# Patient Record
Sex: FEMALE | Race: WHITE | NOT HISPANIC OR LATINO | Employment: FULL TIME | URBAN - METROPOLITAN AREA
[De-identification: names, ages, dates, MRNs, and addresses within clinical notes are randomized per-mention and may not be internally consistent; named-entity substitution may affect disease eponyms.]

---

## 2017-01-20 ENCOUNTER — NON-PROVIDER VISIT (OUTPATIENT)
Dept: OCCUPATIONAL MEDICINE | Facility: CLINIC | Age: 52
End: 2017-01-20

## 2017-01-20 DIAGNOSIS — Z02.1 PRE-EMPLOYMENT DRUG SCREENING: ICD-10-CM

## 2017-01-20 LAB
AMP AMPHETAMINE: NORMAL
COC COCAINE: NORMAL
INT CON NEG: NORMAL
INT CON POS: NORMAL
MET METHAMPHETAMINES: NORMAL
OPI OPIATES: NORMAL
PCP PHENCYCLIDINE: NORMAL
POC DRUG COMMENT 753798-OCCUPATIONAL HEALTH: NORMAL
THC: NORMAL

## 2017-01-20 PROCEDURE — 80305 DRUG TEST PRSMV DIR OPT OBS: CPT | Performed by: PREVENTIVE MEDICINE

## 2017-03-16 ENCOUNTER — PATIENT MESSAGE (OUTPATIENT)
Dept: MEDICAL GROUP | Facility: CLINIC | Age: 52
End: 2017-03-16

## 2017-03-17 ENCOUNTER — PATIENT MESSAGE (OUTPATIENT)
Dept: MEDICAL GROUP | Facility: CLINIC | Age: 52
End: 2017-03-17

## 2017-03-17 ENCOUNTER — APPOINTMENT (OUTPATIENT)
Dept: MEDICAL GROUP | Facility: CLINIC | Age: 52
End: 2017-03-17
Payer: COMMERCIAL

## 2017-03-17 NOTE — TELEPHONE ENCOUNTER
From: Monica Manzo  To: Naina Raya M.D.  Sent: 3/17/2017 8:15 AM PDT  Subject: RE:stomach problems    Did you want me to come in and see you today? Still not better  ----- Message -----  From: Naina Raya M.D.  Sent: 3/16/2017 10:26 AM PDT  To: Monica Manzo  Subject: stomach problems    Are you having fever or chills? Do you have diarrhea? Any nausea, any visible blood in the stool?

## 2017-03-17 NOTE — TELEPHONE ENCOUNTER
1. Caller Name: Monica Manzo                      Call Back Number: [phone] (home) [phone] (work)    2. Message: pt called stating no chills no blood in stool. Fevers at night, nausea, diarrhea painfull when eating or drinking. Please advise or she will go to ED right away.     3. Patient approves office to leave a detailed voicemail/MyChart message: yes

## 2017-03-20 ENCOUNTER — OFFICE VISIT (OUTPATIENT)
Dept: MEDICAL GROUP | Facility: CLINIC | Age: 52
End: 2017-03-20
Payer: COMMERCIAL

## 2017-03-20 VITALS
SYSTOLIC BLOOD PRESSURE: 116 MMHG | RESPIRATION RATE: 16 BRPM | WEIGHT: 160 LBS | OXYGEN SATURATION: 99 % | DIASTOLIC BLOOD PRESSURE: 60 MMHG | BODY MASS INDEX: 25.11 KG/M2 | TEMPERATURE: 97.5 F | HEIGHT: 67 IN | HEART RATE: 62 BPM

## 2017-03-20 DIAGNOSIS — Z87.19 HISTORY OF GASTROINTESTINAL BLEEDING: ICD-10-CM

## 2017-03-20 DIAGNOSIS — Z87.19 HISTORY OF CHRONIC GASTRITIS: ICD-10-CM

## 2017-03-20 DIAGNOSIS — R10.13 EPIGASTRIC ABDOMINAL PAIN: ICD-10-CM

## 2017-03-20 PROCEDURE — 99214 OFFICE O/P EST MOD 30 MIN: CPT | Performed by: FAMILY MEDICINE

## 2017-03-20 RX ORDER — OMEPRAZOLE 20 MG/1
20 CAPSULE, DELAYED RELEASE ORAL DAILY
Qty: 30 CAP | Refills: 3 | Status: SHIPPED | OUTPATIENT
Start: 2017-03-20 | End: 2017-08-31

## 2017-03-20 RX ORDER — SUCRALFATE 1 G/1
1 TABLET ORAL
Qty: 120 TAB | Refills: 3 | Status: SHIPPED | OUTPATIENT
Start: 2017-03-20 | End: 2017-08-31

## 2017-03-20 ASSESSMENT — PATIENT HEALTH QUESTIONNAIRE - PHQ9: CLINICAL INTERPRETATION OF PHQ2 SCORE: 0

## 2017-03-20 NOTE — PROGRESS NOTES
CC: Abdominal pain, history of GI bleed and history of chronic gastritis    HPI:   Monica presents today with the following.    1. Epigastric abdominal pain  She complains of epigastric pain and general abdominal pain with bloating and some sharp pain for approximately a week. This came relatively out of the blue. She had no major change in diet. Denies taking anti-inflammatories. Her appetite remains fairly good but she has mild early satiety. Denies vomiting. She has mild nausea. Denies hematemesis. Denies visible blood or mucus in the stool. Denies white, black or maroon stools.  She has not been taking acid reducing medication of any kind now for several years because she had the Nissen fundoplication and she thought that would completely take care of her problems. Discussed with her that that probably took care of the esophageal reflux fairly well especially since she didn't have much symptoms but the gastric irritation that led to polyps and bleeding would probably still be there. She states she was negative for Helicobacter pylori in the past. There is no blood test for that in her record but I do believe Dr. Gaxiola did a biopsy and culture when he did her endoscopy.  Denies shortness of breath, weakness, faintness or chilling.    2. History of gastrointestinal bleeding  She has had bleeding in the past which was felt to be primarily due to esophagitis and gastritis. Denies any further signs or symptoms of bleeding.    3. History of chronic gastritis  She did have in the past severe chronic gastritis with gastric polyps.      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 30 Cap 3   • sucralfate (CARAFATE) 1 GM Tab Take 1 Tab by mouth 4 Times a Day,Before Meals and at Bedtime. 120 Tab 3   • Cholecalciferol (VITAMIN D PO) Take 1 Tab by mouth every day.     • VITAMIN E PO Take 1 Tab by mouth every day.     • Ascorbic Acid (VITAMIN C PO) Take 1 Tab by mouth every day.       No current facility-administered medications for this visit.     The omeprazole and carafate are added today    Allergies as of 03/20/2017 - [facility] as Reviewed 03/20/2017   Allergen Reaction Noted   • Morphine Vomiting 04/16/2013        ROS: As per HPI.    /60 mmHg  Pulse 62  Temp(Src) 36.4 °C (97.5 °F)  Resp 16  Ht 1.689 m (5' 6.5\")  Wt 72.576 kg (160 lb)  BMI 25.44 kg/m2  SpO2 99%    Physical Exam:  Gen:         Alert and oriented, No apparent distress.  Lucid and fluent.  HEENT:     EOMI, PERRLA.   Oropharynx is well hydrated with normal soft palate motion. No exudate or ulcerations noted.   Neck:         Full range of motion, moderate posterior cervical spasm. No JVD or carotid bruits appreciated. No cervical adenopathy appreciated. No thyromegaly or neck masses appreciated.  No retractions appreciated.  Lungs:       clear to auscultation A&P with good air movement.   Heart:        regular rate and rhythm normal S1 and S2 without murmur appreciated.  Strong and symmetric radial and DP pulses.  Abd:          soft, bowel sounds positive, bloated, epigastrium is quite tender with mild guarding. No rebound appreciated. No hepatosplenomegaly or mass appreciated. No pulsatile mass appreciated.  Ext:           Extremities show symmetric and full range of motion with normal strength. No cyanosis or clubbing appreciated. No peripheral edema appreciated.  Neuro:      Gait is normal. Patient is lucid, fluent and " appropriate. No significant tremor appreciated.  Skin:         shows no rashes, pigmented lesions or ulcerations.      Assessment and Plan.   51 y.o. female with the following issues.    1. Epigastric abdominal pain  Laboratory his discussed and placed. Upper GI ordered as it may take a couple months to get in to gastroenterology. Lab orders discussed and placed. Knee PPI and coating agent discussed.  - CBC WITHOUT DIFFERENTIAL; Future  - BASIC METABOLIC PANEL; Future  - HEPATIC FUNCTION PANEL; Future  - DX-UPPER GI-AIR CONTRAST; Future  - omeprazole (PRILOSEC) 20 MG delayed-release capsule; Take 1 Cap by mouth every day.  Dispense: 30 Cap; Refill: 3  - sucralfate (CARAFATE) 1 GM Tab; Take 1 Tab by mouth 4 Times a Day,Before Meals and at Bedtime.  Dispense: 120 Tab; Refill: 3  - REFERRAL TO GASTROENTEROLOGY    2. History of gastrointestinal bleeding  Orders discussed and placed  - omeprazole (PRILOSEC) 20 MG delayed-release capsule; Take 1 Cap by mouth every day.  Dispense: 30 Cap; Refill: 3  - sucralfate (CARAFATE) 1 GM Tab; Take 1 Tab by mouth 4 Times a Day,Before Meals and at Bedtime.  Dispense: 120 Tab; Refill: 3  - REFERRAL TO GASTROENTEROLOGY    3. History of chronic gastritis  The medication regimen is explained and discussed at length. Referral also discussed and placed.  - omeprazole (PRILOSEC) 20 MG delayed-release capsule; Take 1 Cap by mouth every day.  Dispense: 30 Cap; Refill: 3  - sucralfate (CARAFATE) 1 GM Tab; Take 1 Tab by mouth 4 Times a Day,Before Meals and at Bedtime.  Dispense: 120 Tab; Refill: 3  - REFERRAL TO GASTROENTEROLOGY

## 2017-03-21 ENCOUNTER — PATIENT MESSAGE (OUTPATIENT)
Dept: MEDICAL GROUP | Facility: CLINIC | Age: 52
End: 2017-03-21

## 2017-04-03 ENCOUNTER — HOSPITAL ENCOUNTER (OUTPATIENT)
Dept: LAB | Facility: MEDICAL CENTER | Age: 52
End: 2017-04-03
Attending: FAMILY MEDICINE
Payer: COMMERCIAL

## 2017-04-03 DIAGNOSIS — R10.13 EPIGASTRIC ABDOMINAL PAIN: ICD-10-CM

## 2017-04-03 LAB
ALBUMIN SERPL BCP-MCNC: 4.5 G/DL (ref 3.2–4.9)
ALP SERPL-CCNC: 68 U/L (ref 30–99)
ALT SERPL-CCNC: 10 U/L (ref 2–50)
ANION GAP SERPL CALC-SCNC: 8 MMOL/L (ref 0–11.9)
AST SERPL-CCNC: 16 U/L (ref 12–45)
BILIRUB CONJ SERPL-MCNC: <0.1 MG/DL (ref 0.1–0.5)
BILIRUB INDIRECT SERPL-MCNC: NORMAL MG/DL (ref 0–1)
BILIRUB SERPL-MCNC: 0.4 MG/DL (ref 0.1–1.5)
BUN SERPL-MCNC: 12 MG/DL (ref 8–22)
CALCIUM SERPL-MCNC: 9.5 MG/DL (ref 8.5–10.5)
CHLORIDE SERPL-SCNC: 105 MMOL/L (ref 96–112)
CO2 SERPL-SCNC: 27 MMOL/L (ref 20–33)
CREAT SERPL-MCNC: 0.91 MG/DL (ref 0.5–1.4)
ERYTHROCYTE [DISTWIDTH] IN BLOOD BY AUTOMATED COUNT: 43.1 FL (ref 35.9–50)
GFR SERPL CREATININE-BSD FRML MDRD: >60 ML/MIN/1.73 M 2
GLUCOSE SERPL-MCNC: 90 MG/DL (ref 65–99)
HCT VFR BLD AUTO: 43.3 % (ref 37–47)
HGB BLD-MCNC: 14.6 G/DL (ref 12–16)
MCH RBC QN AUTO: 31.3 PG (ref 27–33)
MCHC RBC AUTO-ENTMCNC: 33.7 G/DL (ref 33.6–35)
MCV RBC AUTO: 92.9 FL (ref 81.4–97.8)
PLATELET # BLD AUTO: 218 K/UL (ref 164–446)
PMV BLD AUTO: 11.3 FL (ref 9–12.9)
POTASSIUM SERPL-SCNC: 4.4 MMOL/L (ref 3.6–5.5)
PROT SERPL-MCNC: 7.7 G/DL (ref 6–8.2)
RBC # BLD AUTO: 4.66 M/UL (ref 4.2–5.4)
SODIUM SERPL-SCNC: 140 MMOL/L (ref 135–145)
WBC # BLD AUTO: 6.2 K/UL (ref 4.8–10.8)

## 2017-04-03 PROCEDURE — 85027 COMPLETE CBC AUTOMATED: CPT

## 2017-04-03 PROCEDURE — 80048 BASIC METABOLIC PNL TOTAL CA: CPT

## 2017-04-03 PROCEDURE — 80076 HEPATIC FUNCTION PANEL: CPT

## 2017-04-03 PROCEDURE — 36415 COLL VENOUS BLD VENIPUNCTURE: CPT

## 2017-04-06 ENCOUNTER — HOSPITAL ENCOUNTER (OUTPATIENT)
Dept: RADIOLOGY | Facility: MEDICAL CENTER | Age: 52
End: 2017-04-06
Attending: FAMILY MEDICINE
Payer: COMMERCIAL

## 2017-04-06 DIAGNOSIS — R10.13 EPIGASTRIC ABDOMINAL PAIN: ICD-10-CM

## 2017-04-06 PROCEDURE — 700101 HCHG RX REV CODE 250: Performed by: FAMILY MEDICINE

## 2017-04-06 PROCEDURE — 74247 DX-UPPER GI-AIR CONTRAST: CPT

## 2017-04-06 PROCEDURE — A9270 NON-COVERED ITEM OR SERVICE: HCPCS | Performed by: FAMILY MEDICINE

## 2017-04-06 PROCEDURE — 700112 HCHG RX REV CODE 229: Performed by: FAMILY MEDICINE

## 2017-04-06 RX ADMIN — ANTACID/ANTIFLATULENT 1 PACKET: 380; 550; 10; 10 GRANULE, EFFERVESCENT ORAL at 08:30

## 2017-04-06 RX ADMIN — BARIUM SULFATE 700 MG: 700 TABLET ORAL at 08:30

## 2017-04-07 ENCOUNTER — PATIENT MESSAGE (OUTPATIENT)
Dept: MEDICAL GROUP | Facility: CLINIC | Age: 52
End: 2017-04-07

## 2017-07-05 ENCOUNTER — HOSPITAL ENCOUNTER (OUTPATIENT)
Dept: RADIOLOGY | Facility: MEDICAL CENTER | Age: 52
End: 2017-07-05
Attending: FAMILY MEDICINE
Payer: COMMERCIAL

## 2017-07-05 DIAGNOSIS — Z12.31 ENCOUNTER FOR MAMMOGRAM TO ESTABLISH BASELINE MAMMOGRAM: ICD-10-CM

## 2017-07-05 PROCEDURE — 77063 BREAST TOMOSYNTHESIS BI: CPT

## 2017-08-18 ENCOUNTER — APPOINTMENT (OUTPATIENT)
Dept: MEDICAL GROUP | Facility: CLINIC | Age: 52
End: 2017-08-18
Payer: COMMERCIAL

## 2017-08-30 ENCOUNTER — TELEPHONE (OUTPATIENT)
Dept: MEDICAL GROUP | Facility: CLINIC | Age: 52
End: 2017-08-30

## 2017-08-30 NOTE — TELEPHONE ENCOUNTER
Future Appointments       Provider Department Center    8/31/2017 3:00 PM Naina Raya M.D. [facility]          ESTABLISHED PATIENT PRE-VISIT PLANNING     Note: Patient will not be contacted if there is no indication to call. PT was not Contacted.    1.    Reviewed note from last office visit with PCP: YES Last office visit: 3/20/17    2.  If any orders were placed at last visit, do we have Results/Consult Notes?        •  Labs - Labs ordered, completed and results are in chart.        •  Imaging - Imaging ordered, completed and results are in chart. UPPER GI       •  Referrals - Referral ordered, patient was seen and consult notes are in chart. Care Teams updated  YES. GASTROENTEROLOGY      3.  Immunizations were updated in Epic using WebIZ?: Epic matches WebIZ       •  Web Iz Recommendations:   MMR   Td (adult), adsorbed   CPOX (Varicella)   Influenza w/preserv.         4.  Patient is due for the following Health Maintenance Topics:   Health Maintenance Due   Topic Date Due   • COLONOSCOPY  05/25/2017   • IMM INFLUENZA (1) 09/01/2017           5.  Patient was not informed to arrive 15 min prior to their scheduled appointment and bring in their medication bottles.

## 2017-08-31 ENCOUNTER — OFFICE VISIT (OUTPATIENT)
Dept: MEDICAL GROUP | Facility: CLINIC | Age: 52
End: 2017-08-31
Payer: COMMERCIAL

## 2017-08-31 VITALS
WEIGHT: 152 LBS | DIASTOLIC BLOOD PRESSURE: 60 MMHG | TEMPERATURE: 98.2 F | HEART RATE: 82 BPM | OXYGEN SATURATION: 98 % | SYSTOLIC BLOOD PRESSURE: 110 MMHG | BODY MASS INDEX: 23.86 KG/M2 | RESPIRATION RATE: 16 BRPM | HEIGHT: 67 IN

## 2017-08-31 DIAGNOSIS — Z00.00 ROUTINE GENERAL MEDICAL EXAMINATION AT A HEALTH CARE FACILITY: ICD-10-CM

## 2017-08-31 DIAGNOSIS — Z00.00 LABORATORY EXAMINATION ORDERED AS PART OF A ROUTINE GENERAL MEDICAL EXAMINATION: ICD-10-CM

## 2017-08-31 PROCEDURE — 99396 PREV VISIT EST AGE 40-64: CPT | Performed by: FAMILY MEDICINE

## 2017-08-31 ASSESSMENT — ENCOUNTER SYMPTOMS
VOMITING: 0
SENSORY CHANGE: 0
FOCAL WEAKNESS: 0
DIARRHEA: 0
NERVOUS/ANXIOUS: 0
PALPITATIONS: 0
INSOMNIA: 0
WHEEZING: 0
DEPRESSION: 0
MYALGIAS: 0
NECK PAIN: 0
TINGLING: 0
SPEECH CHANGE: 0
BACK PAIN: 0
COUGH: 0
CHILLS: 0
HEADACHES: 0
SORE THROAT: 0
DOUBLE VISION: 0
TREMORS: 0
HALLUCINATIONS: 0
ORTHOPNEA: 0
SEIZURES: 0
NAUSEA: 1
SHORTNESS OF BREATH: 0
LOSS OF CONSCIOUSNESS: 0
WEIGHT LOSS: 0
FEVER: 0
SPUTUM PRODUCTION: 0
DIZZINESS: 0
ABDOMINAL PAIN: 1
CONSTIPATION: 1
MEMORY LOSS: 0
BLURRED VISION: 0
HEARTBURN: 1
BLOOD IN STOOL: 0

## 2017-08-31 ASSESSMENT — LIFESTYLE VARIABLES: SUBSTANCE_ABUSE: 0

## 2017-08-31 NOTE — PROGRESS NOTES
"Subjective:      Monica Manzo is a 51 y.o. female who presents with Annual Exam       She is here for his general medical examination.     HPI    Review of Systems   Constitutional: Positive for malaise/fatigue. Negative for chills, fever and weight loss.   HENT: Negative for congestion, hearing loss and sore throat.    Eyes: Negative for blurred vision and double vision.   Respiratory: Negative for cough, sputum production, shortness of breath and wheezing.    Cardiovascular: Negative for chest pain, palpitations, orthopnea and leg swelling.   Gastrointestinal: Positive for abdominal pain, constipation, heartburn and nausea. Negative for blood in stool, diarrhea and vomiting.   Genitourinary: Negative for dysuria, frequency and urgency.   Musculoskeletal: Negative for back pain, joint pain, myalgias and neck pain.   Skin: Negative for rash.   Neurological: Negative for dizziness, tingling, tremors, sensory change, speech change, focal weakness, seizures, loss of consciousness and headaches.   Psychiatric/Behavioral: Negative for depression, hallucinations, memory loss, substance abuse and suicidal ideas. The patient is not nervous/anxious and does not have insomnia.           Objective:     /60   Pulse 82   Temp 36.8 °C (98.2 °F)   Resp 16   Ht 1.689 m (5' 6.5\")   Wt 68.9 kg (152 lb)   SpO2 98%   BMI 24.17 kg/m²      Physical Exam   Constitutional: She is oriented to person, place, and time. She appears well-developed and well-nourished.   HENT:   Head: Normocephalic and atraumatic.   Mouth/Throat: Oropharynx is clear and moist.   Eyes: EOM are normal. Pupils are equal, round, and reactive to light. Left eye exhibits no discharge.   Neck: Normal range of motion. Neck supple. No JVD present. No thyromegaly present.   Cardiovascular: Normal rate, regular rhythm and normal heart sounds.    No murmur heard.  Pulmonary/Chest: Effort normal and breath sounds normal. No respiratory distress. She has no " wheezes. She has no rales.   Abdominal: Soft. Bowel sounds are normal. She exhibits no distension and no mass. There is no tenderness.   Musculoskeletal: Normal range of motion. She exhibits no edema.   Lymphadenopathy:     She has no cervical adenopathy.   Neurological: She is alert and oriented to person, place, and time. Coordination normal.   Skin: Skin is warm and dry. No rash noted. No erythema.   Psychiatric: She has a normal mood and affect. Her behavior is normal.   Vitals reviewed.              Assessment/Plan:     1. Routine general medical examination at a health care facility  She is generally well.  Medical problems are stable.    2. Laboratory examination ordered as part of a routine general medical examination  Routine orders discussed and placed  - COMP METABOLIC PANEL; Future  - LIPID PROFILE; Future  - CBC WITHOUT DIFFERENTIAL; Future

## 2017-09-05 ENCOUNTER — HOSPITAL ENCOUNTER (OUTPATIENT)
Dept: LAB | Facility: MEDICAL CENTER | Age: 52
End: 2017-09-05
Attending: OBSTETRICS & GYNECOLOGY
Payer: COMMERCIAL

## 2017-09-05 PROCEDURE — 88175 CYTOPATH C/V AUTO FLUID REDO: CPT

## 2017-09-05 PROCEDURE — 87624 HPV HI-RISK TYP POOLED RSLT: CPT

## 2017-09-07 LAB
CYTOLOGY REG CYTOL: NORMAL
HPV HR 12 DNA CVX QL NAA+PROBE: NEGATIVE
HPV16 DNA SPEC QL NAA+PROBE: NEGATIVE
HPV18 DNA SPEC QL NAA+PROBE: NEGATIVE
SPECIMEN SOURCE: NORMAL

## 2017-09-13 ENCOUNTER — HOSPITAL ENCOUNTER (OUTPATIENT)
Dept: LAB | Facility: MEDICAL CENTER | Age: 52
End: 2017-09-13
Payer: COMMERCIAL

## 2017-09-13 ENCOUNTER — HOSPITAL ENCOUNTER (OUTPATIENT)
Dept: LAB | Facility: MEDICAL CENTER | Age: 52
End: 2017-09-13
Attending: FAMILY MEDICINE
Payer: COMMERCIAL

## 2017-09-13 DIAGNOSIS — Z00.00 LABORATORY EXAMINATION ORDERED AS PART OF A ROUTINE GENERAL MEDICAL EXAMINATION: ICD-10-CM

## 2017-09-13 LAB
ALBUMIN SERPL BCP-MCNC: 4.2 G/DL (ref 3.2–4.9)
ALBUMIN/GLOB SERPL: 1.3 G/DL
ALP SERPL-CCNC: 72 U/L (ref 30–99)
ALT SERPL-CCNC: 10 U/L (ref 2–50)
ANION GAP SERPL CALC-SCNC: 7 MMOL/L (ref 0–11.9)
AST SERPL-CCNC: 14 U/L (ref 12–45)
BDY FAT % MEASURED: 29.9 %
BILIRUB SERPL-MCNC: 0.7 MG/DL (ref 0.1–1.5)
BP DIAS: 81 MMHG
BP SYS: 118 MMHG
BUN SERPL-MCNC: 16 MG/DL (ref 8–22)
CALCIUM SERPL-MCNC: 9.6 MG/DL (ref 8.5–10.5)
CHLORIDE SERPL-SCNC: 103 MMOL/L (ref 96–112)
CHOLEST SERPL-MCNC: 167 MG/DL (ref 100–199)
CHOLEST SERPL-MCNC: 171 MG/DL (ref 100–199)
CO2 SERPL-SCNC: 27 MMOL/L (ref 20–33)
CREAT SERPL-MCNC: 1.02 MG/DL (ref 0.5–1.4)
DIABETES HTDIA: NO
ERYTHROCYTE [DISTWIDTH] IN BLOOD BY AUTOMATED COUNT: 42.4 FL (ref 35.9–50)
EVENT NAME HTEVT: NORMAL
FASTING HTFAS: YES
GFR SERPL CREATININE-BSD FRML MDRD: 57 ML/MIN/1.73 M 2
GLOBULIN SER CALC-MCNC: 3.2 G/DL (ref 1.9–3.5)
GLUCOSE SERPL-MCNC: 79 MG/DL (ref 65–99)
GLUCOSE SERPL-MCNC: 80 MG/DL (ref 65–99)
HCT VFR BLD AUTO: 44.9 % (ref 37–47)
HDLC SERPL-MCNC: 61 MG/DL
HDLC SERPL-MCNC: 63 MG/DL
HGB BLD-MCNC: 15 G/DL (ref 12–16)
HYPERTENSION HTHYP: NO
LDLC SERPL CALC-MCNC: 89 MG/DL
LDLC SERPL CALC-MCNC: 91 MG/DL
MCH RBC QN AUTO: 30.7 PG (ref 27–33)
MCHC RBC AUTO-ENTMCNC: 33.4 G/DL (ref 33.6–35)
MCV RBC AUTO: 92 FL (ref 81.4–97.8)
PLATELET # BLD AUTO: 214 K/UL (ref 164–446)
PMV BLD AUTO: 10.6 FL (ref 9–12.9)
POTASSIUM SERPL-SCNC: 4.4 MMOL/L (ref 3.6–5.5)
PROT SERPL-MCNC: 7.4 G/DL (ref 6–8.2)
RBC # BLD AUTO: 4.88 M/UL (ref 4.2–5.4)
SCREENING LOC CITY HTCIT: NORMAL
SCREENING LOC STATE HTSTA: NORMAL
SCREENING LOCATION HTLOC: NORMAL
SMOKING HTSMO: NO
SODIUM SERPL-SCNC: 137 MMOL/L (ref 135–145)
SUBSCRIBER ID HTSID: NORMAL
TRIGL SERPL-MCNC: 87 MG/DL (ref 0–149)
TRIGL SERPL-MCNC: 87 MG/DL (ref 0–149)
WBC # BLD AUTO: 5.5 K/UL (ref 4.8–10.8)

## 2017-09-13 PROCEDURE — 85027 COMPLETE CBC AUTOMATED: CPT

## 2017-09-13 PROCEDURE — 80061 LIPID PANEL: CPT

## 2017-09-13 PROCEDURE — 82947 ASSAY GLUCOSE BLOOD QUANT: CPT

## 2017-09-13 PROCEDURE — 36415 COLL VENOUS BLD VENIPUNCTURE: CPT

## 2017-09-13 PROCEDURE — 80061 LIPID PANEL: CPT | Mod: 91

## 2017-09-13 PROCEDURE — S5190 WELLNESS ASSESSMENT BY NONPH: HCPCS

## 2017-09-13 PROCEDURE — 80053 COMPREHEN METABOLIC PANEL: CPT

## 2017-11-08 ENCOUNTER — IMMUNIZATION (OUTPATIENT)
Dept: OCCUPATIONAL MEDICINE | Facility: CLINIC | Age: 52
End: 2017-11-08

## 2017-11-08 DIAGNOSIS — Z23 NEED FOR VACCINATION: ICD-10-CM

## 2017-11-08 PROCEDURE — 90686 IIV4 VACC NO PRSV 0.5 ML IM: CPT | Performed by: PREVENTIVE MEDICINE

## 2017-11-28 ENCOUNTER — HOSPITAL ENCOUNTER (OUTPATIENT)
Dept: RADIOLOGY | Facility: MEDICAL CENTER | Age: 52
End: 2017-11-28
Attending: INTERNAL MEDICINE
Payer: COMMERCIAL

## 2017-11-28 ENCOUNTER — HOSPITAL ENCOUNTER (OUTPATIENT)
Dept: LAB | Facility: MEDICAL CENTER | Age: 52
End: 2017-11-28
Attending: INTERNAL MEDICINE
Payer: COMMERCIAL

## 2017-11-28 DIAGNOSIS — K62.5 HEMORRHAGE OF RECTUM AND ANUS: ICD-10-CM

## 2017-11-28 DIAGNOSIS — R13.19 CONSTANT LOW-GRADE DYSPHAGIA: ICD-10-CM

## 2017-11-28 DIAGNOSIS — R10.13 ABDOMINAL PAIN, EPIGASTRIC: ICD-10-CM

## 2017-11-28 DIAGNOSIS — Z12.11 SPECIAL SCREENING FOR MALIGNANT NEOPLASMS, COLON: ICD-10-CM

## 2017-11-28 LAB
ALBUMIN SERPL BCP-MCNC: 4.1 G/DL (ref 3.2–4.9)
ALP SERPL-CCNC: 64 U/L (ref 30–99)
ALT SERPL-CCNC: 11 U/L (ref 2–50)
AST SERPL-CCNC: 16 U/L (ref 12–45)
BILIRUB CONJ SERPL-MCNC: 0.1 MG/DL (ref 0.1–0.5)
BILIRUB INDIRECT SERPL-MCNC: 0.5 MG/DL (ref 0–1)
BILIRUB SERPL-MCNC: 0.6 MG/DL (ref 0.1–1.5)
LIPASE SERPL-CCNC: 21 U/L (ref 11–82)
PROT SERPL-MCNC: 6.9 G/DL (ref 6–8.2)

## 2017-11-28 PROCEDURE — 80076 HEPATIC FUNCTION PANEL: CPT

## 2017-11-28 PROCEDURE — 36415 COLL VENOUS BLD VENIPUNCTURE: CPT

## 2017-11-28 PROCEDURE — 76700 US EXAM ABDOM COMPLETE: CPT

## 2017-11-28 PROCEDURE — 83690 ASSAY OF LIPASE: CPT

## 2018-07-31 ENCOUNTER — OFFICE VISIT (OUTPATIENT)
Dept: MEDICAL GROUP | Facility: CLINIC | Age: 53
End: 2018-07-31
Payer: COMMERCIAL

## 2018-07-31 VITALS
TEMPERATURE: 98.2 F | HEART RATE: 63 BPM | RESPIRATION RATE: 14 BRPM | OXYGEN SATURATION: 99 % | SYSTOLIC BLOOD PRESSURE: 102 MMHG | BODY MASS INDEX: 23.46 KG/M2 | DIASTOLIC BLOOD PRESSURE: 64 MMHG | HEIGHT: 66 IN | WEIGHT: 146 LBS

## 2018-07-31 DIAGNOSIS — Z98.890 HISTORY OF OPEN REDUCTION AND INTERNAL FIXATION (ORIF) PROCEDURE: ICD-10-CM

## 2018-07-31 DIAGNOSIS — M25.571 CHRONIC PAIN OF RIGHT ANKLE: ICD-10-CM

## 2018-07-31 DIAGNOSIS — Z12.39 SCREENING BREAST EXAMINATION: ICD-10-CM

## 2018-07-31 DIAGNOSIS — Z00.00 LABORATORY EXAMINATION ORDERED AS PART OF A ROUTINE GENERAL MEDICAL EXAMINATION: ICD-10-CM

## 2018-07-31 DIAGNOSIS — G89.29 CHRONIC PAIN OF RIGHT ANKLE: ICD-10-CM

## 2018-07-31 DIAGNOSIS — Z00.00 ROUTINE GENERAL MEDICAL EXAMINATION AT A HEALTH CARE FACILITY: ICD-10-CM

## 2018-07-31 PROCEDURE — 99396 PREV VISIT EST AGE 40-64: CPT | Performed by: FAMILY MEDICINE

## 2018-07-31 ASSESSMENT — ENCOUNTER SYMPTOMS
EYE PAIN: 0
INSOMNIA: 0
MYALGIAS: 0
SENSORY CHANGE: 0
HALLUCINATIONS: 0
MEMORY LOSS: 0
COUGH: 0
FOCAL WEAKNESS: 0
CHILLS: 0
NERVOUS/ANXIOUS: 0
VOMITING: 0
PALPITATIONS: 0
HEARTBURN: 0
SEIZURES: 0
LOSS OF CONSCIOUSNESS: 0
DIZZINESS: 0
HEADACHES: 0
DOUBLE VISION: 0
BRUISES/BLEEDS EASILY: 0
FEVER: 0
SPUTUM PRODUCTION: 0
TREMORS: 0
BLURRED VISION: 0
DEPRESSION: 0
ABDOMINAL PAIN: 0
ORTHOPNEA: 0
SORE THROAT: 0
NAUSEA: 1
NECK PAIN: 0
WEIGHT LOSS: 0
BLOOD IN STOOL: 0
BACK PAIN: 0
DIARRHEA: 0
WEAKNESS: 0
SPEECH CHANGE: 0
TINGLING: 0
DIAPHORESIS: 0
WHEEZING: 0
SHORTNESS OF BREATH: 0

## 2018-07-31 ASSESSMENT — PATIENT HEALTH QUESTIONNAIRE - PHQ9: CLINICAL INTERPRETATION OF PHQ2 SCORE: 0

## 2018-07-31 ASSESSMENT — LIFESTYLE VARIABLES: SUBSTANCE_ABUSE: 0

## 2018-07-31 NOTE — PROGRESS NOTES
Right ankle ORIF for malleolar fracture in 2008.  Is having chronic right ankle pain off and on, worse in the last year.  Is in the region of the surgery and hardware.  Denies redness, fever or chills.  Has noted slight swelling.  Referral discussed and placed.

## 2018-07-31 NOTE — PROGRESS NOTES
Subjective:      Monica Manzo is a 52 y.o. female who presents with Annual Exam        She is here for her general medical examination.    HPI     has a past medical history of Anesthesia; Ankle fracture (12/08); Arthritis; Duodenal ulcer, unspecified as acute or chronic, without hemorrhage, perforation, or obstruction (7/2009); Endometriosis; Gastric polyps; Gastritis (2007); GERD (gastroesophageal reflux disease) (6/5/2009); Hiatus hernia syndrome; History of anemia; History of hepatitis C (1996); History of hypothyroidism; Intestinal metaplasia of gastric mucosa; and Intestinal metaplasia of gastric mucosa.   has a past surgical history that includes hysterectomy, vaginal; liver biopsy (4/17/08); tonsillectomy; ankle orif (12/14/2008); ankle orif (12/08); pippa by laparoscopy (4/17/08); abdominal hysterectomy total; pr anesth,surg breast reconstructive (12/15/2010); nissen fundoplication laparoscopic (7/6/2011); pr reduction of large breast; pelviscopy (4/22/2013); pelviscopy (1/29/2014); and lysis adhesions gyn (1/29/2014).  family history includes Cancer (age of onset: 45) in her mother; Diabetes in her sister; Heart Disease in her father.   reports that she has never smoked. She has never used smokeless tobacco. She reports that she does not drink alcohol or use drugs.      Current Outpatient Prescriptions:   •  Cholecalciferol (VITAMIN D PO), Take 1 Tab by mouth every day., Disp: , Rfl:   •  VITAMIN E PO, Take 1 Tab by mouth every day., Disp: , Rfl:   •  Ascorbic Acid (VITAMIN C PO), Take 1 Tab by mouth every day., Disp: , Rfl:   is allergic to morphine.    Review of Systems   Constitutional: Negative for chills, diaphoresis, fever, malaise/fatigue and weight loss.   HENT: Negative for congestion, ear pain, hearing loss, sore throat and tinnitus.    Eyes: Negative for blurred vision, double vision and pain.   Respiratory: Negative for cough, sputum production, shortness of breath and wheezing.   "  Cardiovascular: Negative for chest pain, palpitations, orthopnea and leg swelling.   Gastrointestinal: Positive for nausea. Negative for abdominal pain, blood in stool, diarrhea, heartburn and vomiting.        Occasional nausea and abdominal pain for a long time.   Genitourinary: Negative for dysuria, frequency, hematuria and urgency.   Musculoskeletal: Negative for back pain, joint pain, myalgias and neck pain.   Skin: Negative for rash.   Neurological: Negative for dizziness, tingling, tremors, sensory change, speech change, focal weakness, seizures, loss of consciousness, weakness and headaches.   Endo/Heme/Allergies: Negative for environmental allergies. Does not bruise/bleed easily.   Psychiatric/Behavioral: Negative for depression, hallucinations, memory loss, substance abuse and suicidal ideas. The patient is not nervous/anxious and does not have insomnia.           Objective:     /64   Pulse 63   Temp 36.8 °C (98.2 °F)   Resp 14   Ht 1.676 m (5' 6\")   Wt 66.2 kg (146 lb)   SpO2 99%   BMI 23.57 kg/m²      Physical Exam   Constitutional: She is oriented to person, place, and time. She appears well-developed and well-nourished.   HENT:   Head: Normocephalic and atraumatic.   Mouth/Throat: Oropharynx is clear and moist.   Eyes: Pupils are equal, round, and reactive to light. EOM are normal. Left eye exhibits no discharge.   Neck: Normal range of motion. Neck supple. No JVD present. No thyromegaly present.   Cardiovascular: Normal rate, regular rhythm and normal heart sounds.    No murmur heard.  Pulmonary/Chest: Effort normal and breath sounds normal. No respiratory distress. She has no wheezes. She has no rales.   Abdominal: Soft. Bowel sounds are normal. She exhibits no distension and no mass. There is no tenderness.   Musculoskeletal: Normal range of motion. She exhibits tenderness. She exhibits no edema.        Feet:    Lymphadenopathy:     She has no cervical adenopathy.   Neurological: She " is alert and oriented to person, place, and time. Coordination normal.   Skin: Skin is warm and dry. No rash noted. No erythema.   Psychiatric: She has a normal mood and affect. Her behavior is normal.   Vitals reviewed.              Assessment/Plan:     1. Routine general medical examination at a health care facility  She is generally well.  Medical problems are stable.    2. Laboratory examination ordered as part of a routine general medical examination  Routine orders discussed and placed  - COMP METABOLIC PANEL; Future  - LIPID PROFILE; Future  - CBC WITHOUT DIFFERENTIAL; Future    3. Screening breast examination  Order discussed and placed.  - MA-MAMMO SCREENING BILAT W/MARY W/CAD; Future

## 2018-08-17 ENCOUNTER — HOSPITAL ENCOUNTER (OUTPATIENT)
Dept: RADIOLOGY | Facility: MEDICAL CENTER | Age: 53
End: 2018-08-17
Attending: FAMILY MEDICINE
Payer: COMMERCIAL

## 2018-08-17 DIAGNOSIS — Z12.39 SCREENING BREAST EXAMINATION: ICD-10-CM

## 2018-08-17 PROCEDURE — 77067 SCR MAMMO BI INCL CAD: CPT

## 2018-08-25 ENCOUNTER — HOSPITAL ENCOUNTER (OUTPATIENT)
Dept: LAB | Facility: MEDICAL CENTER | Age: 53
End: 2018-08-25
Payer: COMMERCIAL

## 2018-08-25 ENCOUNTER — HOSPITAL ENCOUNTER (OUTPATIENT)
Dept: LAB | Facility: MEDICAL CENTER | Age: 53
End: 2018-08-25
Attending: FAMILY MEDICINE
Payer: COMMERCIAL

## 2018-08-25 LAB
ALBUMIN SERPL BCP-MCNC: 4.2 G/DL (ref 3.2–4.9)
ALBUMIN/GLOB SERPL: 1.4 G/DL
ALP SERPL-CCNC: 71 U/L (ref 30–99)
ALT SERPL-CCNC: 14 U/L (ref 2–50)
ANION GAP SERPL CALC-SCNC: 8 MMOL/L (ref 0–11.9)
AST SERPL-CCNC: 19 U/L (ref 12–45)
BASOPHILS # BLD AUTO: 0.9 % (ref 0–1.8)
BASOPHILS # BLD: 0.05 K/UL (ref 0–0.12)
BDY FAT % MEASURED: 27.1 %
BILIRUB SERPL-MCNC: 0.6 MG/DL (ref 0.1–1.5)
BP DIAS: 89 MMHG
BP SYS: 125 MMHG
BUN SERPL-MCNC: 21 MG/DL (ref 8–22)
CALCIUM SERPL-MCNC: 9.4 MG/DL (ref 8.5–10.5)
CHLORIDE SERPL-SCNC: 107 MMOL/L (ref 96–112)
CHOLEST SERPL-MCNC: 195 MG/DL (ref 100–199)
CHOLEST SERPL-MCNC: 199 MG/DL (ref 100–199)
CO2 SERPL-SCNC: 27 MMOL/L (ref 20–33)
CREAT SERPL-MCNC: 0.95 MG/DL (ref 0.5–1.4)
DIABETES HTDIA: NO
EOSINOPHIL # BLD AUTO: 0.15 K/UL (ref 0–0.51)
EOSINOPHIL NFR BLD: 2.7 % (ref 0–6.9)
ERYTHROCYTE [DISTWIDTH] IN BLOOD BY AUTOMATED COUNT: 44.5 FL (ref 35.9–50)
EVENT NAME HTEVT: NORMAL
FASTING HTFAS: YES
GLOBULIN SER CALC-MCNC: 2.9 G/DL (ref 1.9–3.5)
GLUCOSE SERPL-MCNC: 94 MG/DL (ref 65–99)
GLUCOSE SERPL-MCNC: 96 MG/DL (ref 65–99)
HCT VFR BLD AUTO: 45.5 % (ref 37–47)
HDLC SERPL-MCNC: 76 MG/DL
HDLC SERPL-MCNC: 78 MG/DL
HGB BLD-MCNC: 14.7 G/DL (ref 12–16)
HYPERTENSION HTHYP: NO
IMM GRANULOCYTES # BLD AUTO: 0.01 K/UL (ref 0–0.11)
IMM GRANULOCYTES NFR BLD AUTO: 0.2 % (ref 0–0.9)
LDLC SERPL CALC-MCNC: 101 MG/DL
LDLC SERPL CALC-MCNC: 108 MG/DL
LYMPHOCYTES # BLD AUTO: 1.99 K/UL (ref 1–4.8)
LYMPHOCYTES NFR BLD: 36.4 % (ref 22–41)
MCH RBC QN AUTO: 30.5 PG (ref 27–33)
MCHC RBC AUTO-ENTMCNC: 32.3 G/DL (ref 33.6–35)
MCV RBC AUTO: 94.4 FL (ref 81.4–97.8)
MONOCYTES # BLD AUTO: 0.41 K/UL (ref 0–0.85)
MONOCYTES NFR BLD AUTO: 7.5 % (ref 0–13.4)
NEUTROPHILS # BLD AUTO: 2.85 K/UL (ref 2–7.15)
NEUTROPHILS NFR BLD: 52.3 % (ref 44–72)
NRBC # BLD AUTO: 0 K/UL
NRBC BLD-RTO: 0 /100 WBC
PLATELET # BLD AUTO: 205 K/UL (ref 164–446)
PMV BLD AUTO: 10.5 FL (ref 9–12.9)
POTASSIUM SERPL-SCNC: 4.6 MMOL/L (ref 3.6–5.5)
PROT SERPL-MCNC: 7.1 G/DL (ref 6–8.2)
RBC # BLD AUTO: 4.82 M/UL (ref 4.2–5.4)
SCREENING LOC CITY HTCIT: NORMAL
SCREENING LOC STATE HTSTA: NORMAL
SCREENING LOCATION HTLOC: NORMAL
SMOKING HTSMO: NO
SODIUM SERPL-SCNC: 142 MMOL/L (ref 135–145)
SUBSCRIBER ID HTSID: NORMAL
TRIGL SERPL-MCNC: 77 MG/DL (ref 0–149)
TRIGL SERPL-MCNC: 79 MG/DL (ref 0–149)
WBC # BLD AUTO: 5.5 K/UL (ref 4.8–10.8)

## 2018-08-25 PROCEDURE — 80061 LIPID PANEL: CPT | Mod: 91

## 2018-08-25 PROCEDURE — 80061 LIPID PANEL: CPT

## 2018-08-25 PROCEDURE — 85025 COMPLETE CBC W/AUTO DIFF WBC: CPT

## 2018-08-25 PROCEDURE — 80053 COMPREHEN METABOLIC PANEL: CPT

## 2018-08-25 PROCEDURE — 36415 COLL VENOUS BLD VENIPUNCTURE: CPT

## 2018-08-25 PROCEDURE — S5190 WELLNESS ASSESSMENT BY NONPH: HCPCS

## 2018-08-25 PROCEDURE — 82947 ASSAY GLUCOSE BLOOD QUANT: CPT

## 2018-08-27 ENCOUNTER — PATIENT MESSAGE (OUTPATIENT)
Dept: MEDICAL GROUP | Facility: CLINIC | Age: 53
End: 2018-08-27

## 2018-08-27 DIAGNOSIS — Z87.42 HISTORY OF OVARIAN CYST: ICD-10-CM

## 2018-08-27 DIAGNOSIS — R10.2 ACUTE PELVIC PAIN, FEMALE: ICD-10-CM

## 2018-08-30 ENCOUNTER — HOSPITAL ENCOUNTER (OUTPATIENT)
Dept: LAB | Facility: MEDICAL CENTER | Age: 53
End: 2018-08-30
Attending: OBSTETRICS & GYNECOLOGY
Payer: COMMERCIAL

## 2018-08-30 LAB
BASOPHILS # BLD AUTO: 0.6 % (ref 0–1.8)
BASOPHILS # BLD: 0.04 K/UL (ref 0–0.12)
EOSINOPHIL # BLD AUTO: 0.12 K/UL (ref 0–0.51)
EOSINOPHIL NFR BLD: 1.8 % (ref 0–6.9)
ERYTHROCYTE [DISTWIDTH] IN BLOOD BY AUTOMATED COUNT: 44.1 FL (ref 35.9–50)
HCT VFR BLD AUTO: 42.3 % (ref 37–47)
HGB BLD-MCNC: 14.1 G/DL (ref 12–16)
IMM GRANULOCYTES # BLD AUTO: 0.02 K/UL (ref 0–0.11)
IMM GRANULOCYTES NFR BLD AUTO: 0.3 % (ref 0–0.9)
LYMPHOCYTES # BLD AUTO: 2 K/UL (ref 1–4.8)
LYMPHOCYTES NFR BLD: 30.1 % (ref 22–41)
MCH RBC QN AUTO: 31.1 PG (ref 27–33)
MCHC RBC AUTO-ENTMCNC: 33.3 G/DL (ref 33.6–35)
MCV RBC AUTO: 93.2 FL (ref 81.4–97.8)
MONOCYTES # BLD AUTO: 0.51 K/UL (ref 0–0.85)
MONOCYTES NFR BLD AUTO: 7.7 % (ref 0–13.4)
NEUTROPHILS # BLD AUTO: 3.95 K/UL (ref 2–7.15)
NEUTROPHILS NFR BLD: 59.5 % (ref 44–72)
NRBC # BLD AUTO: 0 K/UL
NRBC BLD-RTO: 0 /100 WBC
PLATELET # BLD AUTO: 213 K/UL (ref 164–446)
PMV BLD AUTO: 10.4 FL (ref 9–12.9)
RBC # BLD AUTO: 4.54 M/UL (ref 4.2–5.4)
WBC # BLD AUTO: 6.6 K/UL (ref 4.8–10.8)

## 2018-08-30 PROCEDURE — 36415 COLL VENOUS BLD VENIPUNCTURE: CPT

## 2018-08-30 PROCEDURE — 85025 COMPLETE CBC W/AUTO DIFF WBC: CPT

## 2018-09-04 ENCOUNTER — PATIENT MESSAGE (OUTPATIENT)
Dept: MEDICAL GROUP | Facility: CLINIC | Age: 53
End: 2018-09-04

## 2018-09-04 DIAGNOSIS — R10.2 CHRONIC FEMALE PELVIC PAIN: ICD-10-CM

## 2018-09-04 DIAGNOSIS — G89.29 CHRONIC FEMALE PELVIC PAIN: ICD-10-CM

## 2018-09-05 NOTE — TELEPHONE ENCOUNTER
From: Monica Manzo  To: Geovanni Raya M.D.  Sent: 9/4/2018 2:27 PM PDT  Subject: Test Result Question    Yes I am, it has not gone away  ----- Message -----  From: Geovanni Raya M.D.  Sent: 9/4/2018 1:50 PM PDT  To: Monica Manzo  Subject: RE: Test Result Question  Your CBC result from the 30th is completely normal. There was no evidence of infection on that blood test. I assume you are still having pelvic pain? If so, we could consider getting a CT scan    Geovanni Raya M.D.      ----- Message -----   From: Monica Manzo   Sent: 9/4/2018 11:34 AM PDT   To: Geovanni Raya M.D.  Subject: Test Result Question    Good morning Dr Raya, I just got a call from Dr Bui's office and was told that my test results were normal. He wanted me to do a blood test for my pain. 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    Monica

## 2018-09-06 ENCOUNTER — PATIENT MESSAGE (OUTPATIENT)
Dept: MEDICAL GROUP | Facility: CLINIC | Age: 53
End: 2018-09-06

## 2018-09-06 NOTE — TELEPHONE ENCOUNTER
From: Monica Manzo  To: Geovanni Raya M.D.  Sent: 9/6/2018 9:47 AM PDT  Subject: Test Result Question    Diarrhea comes and goes, it is not consistent. No fever   ----- Message -----  From: Geovanni Raya M.D.  Sent: 9/5/2018 10:04 AM PDT  To: Monica Manzo  Subject: RE: Test Result Question  Any diarrhea or fever? I have placed the pelvic CT order    Geovanni Raya M.D.      ----- Message -----   From: Monica Manzo   Sent: 9/4/2018 2:27 PM PDT   To: Geovanni Raya M.D.  Subject: Test Result Question    Yes I am, it has not gone away  ----- Message -----  From: Geovanni Raya M.D.  Sent: 9/4/2018 1:50 PM PDT  To: Monica Manzo  Subject: RE: Test Result Question  Your CBC result from the 30th is completely normal. There was no evidence of infection on that blood test. I assume you are still having pelvic pain? If so, we could consider getting a CT scan    Geovanni Raya M.D.      ----- Message -----   From: Monica Manzo   Sent: 9/4/2018 11:34 AM PDT   To: Geovanni Raya M.D.  Subject: Test Result Question    Good morning Dr Raya, I just got a call from Dr Bui's office and was told that my test results were normal. He wanted me to do a blood test for my pain. 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    Monica

## 2018-09-10 ENCOUNTER — APPOINTMENT (OUTPATIENT)
Dept: ADMISSIONS | Facility: MEDICAL CENTER | Age: 53
End: 2018-09-10
Attending: ORTHOPAEDIC SURGERY
Payer: COMMERCIAL

## 2018-09-11 ENCOUNTER — APPOINTMENT (OUTPATIENT)
Dept: RADIOLOGY | Facility: MEDICAL CENTER | Age: 53
End: 2018-09-11
Attending: ORTHOPAEDIC SURGERY
Payer: COMMERCIAL

## 2018-09-11 ENCOUNTER — HOSPITAL ENCOUNTER (OUTPATIENT)
Facility: MEDICAL CENTER | Age: 53
End: 2018-09-11
Attending: ORTHOPAEDIC SURGERY | Admitting: ORTHOPAEDIC SURGERY
Payer: COMMERCIAL

## 2018-09-11 VITALS
HEART RATE: 62 BPM | TEMPERATURE: 98.2 F | WEIGHT: 147.05 LBS | OXYGEN SATURATION: 97 % | RESPIRATION RATE: 16 BRPM | HEIGHT: 67 IN | BODY MASS INDEX: 23.08 KG/M2

## 2018-09-11 PROCEDURE — 160035 HCHG PACU - 1ST 60 MINS PHASE I: Performed by: ORTHOPAEDIC SURGERY

## 2018-09-11 PROCEDURE — 160028 HCHG SURGERY MINUTES - 1ST 30 MINS LEVEL 3: Performed by: ORTHOPAEDIC SURGERY

## 2018-09-11 PROCEDURE — 700101 HCHG RX REV CODE 250

## 2018-09-11 PROCEDURE — 501838 HCHG SUTURE GENERAL: Performed by: ORTHOPAEDIC SURGERY

## 2018-09-11 PROCEDURE — A6454 SELF-ADHER BAND W>=3" <5"/YD: HCPCS | Performed by: ORTHOPAEDIC SURGERY

## 2018-09-11 PROCEDURE — 160025 RECOVERY II MINUTES (STATS): Performed by: ORTHOPAEDIC SURGERY

## 2018-09-11 PROCEDURE — 500881 HCHG PACK, EXTREMITY: Performed by: ORTHOPAEDIC SURGERY

## 2018-09-11 PROCEDURE — 700102 HCHG RX REV CODE 250 W/ 637 OVERRIDE(OP)

## 2018-09-11 PROCEDURE — 73610 X-RAY EXAM OF ANKLE: CPT | Mod: RT

## 2018-09-11 PROCEDURE — 160039 HCHG SURGERY MINUTES - EA ADDL 1 MIN LEVEL 3: Performed by: ORTHOPAEDIC SURGERY

## 2018-09-11 PROCEDURE — 160036 HCHG PACU - EA ADDL 30 MINS PHASE I: Performed by: ORTHOPAEDIC SURGERY

## 2018-09-11 PROCEDURE — 160048 HCHG OR STATISTICAL LEVEL 1-5: Performed by: ORTHOPAEDIC SURGERY

## 2018-09-11 PROCEDURE — 160046 HCHG PACU - 1ST 60 MINS PHASE II: Performed by: ORTHOPAEDIC SURGERY

## 2018-09-11 PROCEDURE — A9270 NON-COVERED ITEM OR SERVICE: HCPCS

## 2018-09-11 PROCEDURE — 700111 HCHG RX REV CODE 636 W/ 250 OVERRIDE (IP)

## 2018-09-11 PROCEDURE — 160002 HCHG RECOVERY MINUTES (STAT): Performed by: ORTHOPAEDIC SURGERY

## 2018-09-11 PROCEDURE — 160009 HCHG ANES TIME/MIN: Performed by: ORTHOPAEDIC SURGERY

## 2018-09-11 RX ORDER — BUPIVACAINE HYDROCHLORIDE 5 MG/ML
INJECTION, SOLUTION EPIDURAL; INTRACAUDAL
Status: DISCONTINUED | OUTPATIENT
Start: 2018-09-11 | End: 2018-09-11 | Stop reason: HOSPADM

## 2018-09-11 RX ORDER — ACETAMINOPHEN 500 MG
TABLET ORAL
Status: DISCONTINUED
Start: 2018-09-11 | End: 2018-09-11 | Stop reason: HOSPADM

## 2018-09-11 RX ORDER — OXYCODONE HCL 10 MG/1
TABLET, FILM COATED, EXTENDED RELEASE ORAL
Status: DISCONTINUED
Start: 2018-09-11 | End: 2018-09-11 | Stop reason: HOSPADM

## 2018-09-11 RX ORDER — SODIUM CHLORIDE, SODIUM LACTATE, POTASSIUM CHLORIDE, CALCIUM CHLORIDE 600; 310; 30; 20 MG/100ML; MG/100ML; MG/100ML; MG/100ML
INJECTION, SOLUTION INTRAVENOUS CONTINUOUS
Status: DISCONTINUED | OUTPATIENT
Start: 2018-09-11 | End: 2018-09-11 | Stop reason: HOSPADM

## 2018-09-11 RX ORDER — LIDOCAINE HYDROCHLORIDE 10 MG/ML
INJECTION, SOLUTION INFILTRATION; PERINEURAL
Status: COMPLETED
Start: 2018-09-11 | End: 2018-09-11

## 2018-09-11 RX ORDER — SCOLOPAMINE TRANSDERMAL SYSTEM 1 MG/1
PATCH, EXTENDED RELEASE TRANSDERMAL
Status: COMPLETED
Start: 2018-09-11 | End: 2018-09-11

## 2018-09-11 RX ORDER — LIDOCAINE HYDROCHLORIDE 10 MG/ML
0.5 INJECTION, SOLUTION INFILTRATION; PERINEURAL
Status: DISCONTINUED | OUTPATIENT
Start: 2018-09-11 | End: 2018-09-11 | Stop reason: HOSPADM

## 2018-09-11 RX ADMIN — LIDOCAINE HYDROCHLORIDE 0.5 ML: 10 INJECTION, SOLUTION INFILTRATION; PERINEURAL at 06:00

## 2018-09-11 RX ADMIN — SODIUM CHLORIDE, SODIUM LACTATE, POTASSIUM CHLORIDE, CALCIUM CHLORIDE: 600; 310; 30; 20 INJECTION, SOLUTION INTRAVENOUS at 06:15

## 2018-09-11 RX ADMIN — SCOPOLAMINE 1 PATCH: 1 PATCH, EXTENDED RELEASE TRANSDERMAL at 06:45

## 2018-09-11 ASSESSMENT — PAIN SCALES - GENERAL
PAINLEVEL_OUTOF10: 2
PAINLEVEL_OUTOF10: 0
PAINLEVEL_OUTOF10: 2
PAINLEVEL_OUTOF10: 2
PAINLEVEL_OUTOF10: 6
PAINLEVEL_OUTOF10: 2
PAINLEVEL_OUTOF10: 0

## 2018-09-11 NOTE — OR NURSING
Patient has done well in recovery. Right ankle is elevated with ice and she is having mild pain that is tolerable. Denies nausea and is now having crackers and ginger ale.  has been called and message left. He is golfing. She is ready for stage 2. Scripts are in her NIA chart.

## 2018-09-11 NOTE — OR SURGEON
Immediate Post OP Note    PreOp Diagnosis: Right ankle retained hardware    PostOp Diagnosis: Same    Procedure(s):  HARDWARE REMOVAL ORTHO- ANKLE (4.0 CANNULATED SCREW) - Wound Class: Clean    Surgeon(s):  García Hernández M.D.    Anesthesiologist/Type of Anesthesia:  Anesthesiologist: Chrissie Woods M.D./General    Surgical Staff:  Circulator: Gabby Arguelles R.N.  Scrub Person: Gilda Madrigal; Evaristo Gracia  Radiology Technologist: Geraldine Palafox    Specimens removed if any:  * No specimens in log *    Estimated Blood Loss: 5cc    Findings: Healed medial mall fracture    Complications: None        9/11/2018 7:25 AM García Hernández M.D.

## 2018-09-11 NOTE — OP REPORT
DATE OF SERVICE:  09/11/2018    PREOPERATIVE DIAGNOSES:  1.  Right ankle retained hardware.  2.  Right healed medial malleolus fracture, routine.    POSTOPERATIVE DIAGNOSES:  1.  Right ankle retained hardware.  2.  Right healed medial malleolus fracture, routine.    PROCEDURE:  Removal of hardware, medial ankle.    SURGEON:  García Hernández MD    ASSISTANT:  SAMSON Amaya    ANESTHESIA:  General with local 0.5% Marcaine without epinephrine.    ESTIMATED BLOOD LOSS:  5 mL.    TOURNIQUET TIME:  None.    FINDINGS:  Healed medial malleolar fracture.    COMPLICATIONS:  None.    OUTCOME:  To PACU in stable condition.    HISTORY OF PRESENT ILLNESS:  The patient is a very pleasant 52-year-old   female, 10 years out from ORIF of medial malleolus fracture.  She has gone to   heal and have prominent screw heads that are bothersome.  She was agreed in   the preoperative holding area and identified by name and medical record   number.  The right lower extremity was marked.  Risks of the procedure   including bleeding, infection, pain, need for more surgery, neurovascular   damage were discussed and she provided written consent.    DESCRIPTION OF PROCEDURE:  She was taken to the operating room and placed on   the table in supine position.  Preoperative antibiotics were administered and   general anesthesia was induced.  A nonsterile tourniquet was placed on her   right thigh that was not used, right lower extremity was prepped and draped in   the usual sterile fashion.  An operative pause was undertaken, where all   present were in agreement with patient identification, laterality, and   procedure to be performed.  The patient had a long curvilinear anteromedial   incision that was quite far from the screw heads.  We elected instead to make   a 1 cm longitudinal incision directly over the palpable screw heads.  Blunt   dissection was carried down to the posterior screw head and then the anterior   screw head was found  with x-rays.  It was completely overgrown with bone.    Each screw was removed without significant difficulty and the screw tracks   were curetted.  The wound was copiously irrigated and closed with 3-0 nylon in   horizontal mattress fashion.  AP, mortise and lateral fluoroscopy revealed   healing of her medial malleolus without evidence of interval complication.    She was awoken and extubated in stable condition.    POSTOPERATIVE COURSE:  She was discharged to home today assuming adequate pain   control and mobilization.  She may change her dressing on postoperative day   #3.  I will see her in 10-14 days for suture removal and wound check.       ____________________________________     MD PETERSON NICOLAS / RODRIGO    DD:  09/11/2018 07:31:31  DT:  09/11/2018 08:17:26    D#:  [id number]  Job#:  [id number]

## 2018-09-11 NOTE — OR NURSING
arrived to hospital for discharge home. Discharge orders in computer. Patient verbalizes readiness for discharge. IV discontinued. Instructions and medications reviewed with patient and family.  Follow up appointment discussed.  Patient and family verbalize understanding of discharge instructions and medications. Patient and family signed discharge instructions. Patient verbalized all belongings had been returned.  Discharged via wheelchair to home with family.

## 2018-09-11 NOTE — DISCHARGE INSTRUCTIONS
ACTIVITY: Rest and take it easy for the first 24 hours.  A responsible adult is recommended to remain with you during that time.  It is normal to feel sleepy.  We encourage you to not do anything that requires balance, judgment or coordination.    MILD FLU-LIKE SYMPTOMS ARE NORMAL. YOU MAY EXPERIENCE GENERALIZED MUSCLE ACHES, THROAT IRRITATION, HEADACHE AND/OR SOME NAUSEA.    FOR 24 HOURS DO NOT:  Drive, operate machinery or run household appliances.  Drink beer or alcoholic beverages.   Make important decisions or sign legal documents.    SPECIAL INSTRUCTIONS: Loosen ace wrap in 8 hours (3:30pm), keep right leg elevated above heart and ice to site. Please refer to your after care instruction provided in your [facility] folder.    Weight Bearing As Tolerated Right Lower Extremity   Ice and elevate   May change dressing Friday, Post Operative Day #3 (8/14/18)  Stay ahead of pain    DIET: To avoid nausea, slowly advance diet as tolerated, avoiding spicy or greasy foods for the first day.  Add more substantial food to your diet according to your physician's instructions.  Babies can be fed formula or breast milk as soon as they are hungry.  INCREASE FLUIDS AND FIBER TO AVOID CONSTIPATION.    SURGICAL DRESSING/BATHING: See Discharge instructions from [facility] in your folder    FOLLOW-UP APPOINTMENT:  A follow-up appointment should be arranged with your doctor is 9/24/18 @1100 am     You should CALL YOUR PHYSICIAN if you develop:  Fever greater than 101 degrees F.  Pain not relieved by medication, or persistent nausea or vomiting.  Excessive bleeding (blood soaking through dressing) or unexpected drainage from the wound.  Extreme redness or swelling around the incision site, drainage of pus or foul smelling drainage.  Inability to urinate or empty your bladder within 8 hours.  Problems with breathing or chest pain.    You should call 911 if you develop problems with breathing or chest  pain.  If you are unable to contact your doctor or surgical center, you should go to the nearest emergency room or urgent care center.  Physician's telephone #: Dr. Hernández [phone]    If any questions arise, call your doctor.  If your doctor is not available, please feel free to call the Surgical Center at [phone].  The Center is open Monday through Friday from 7AM to 7PM.  You can also call the HEALTH HOTLINE open 24 hours/day, 7 days/week and speak to a nurse at [phone], or toll free at [phone].    A registered nurse may call you a few days after your surgery to see how you are doing after your procedure.    MEDICATIONS: Resume taking daily medication.  Take prescribed pain medication with food.  If no medication is prescribed, you may take non-aspirin pain medication if needed.  PAIN MEDICATION CAN BE VERY CONSTIPATING.  Take a stool softener or laxative such as senokot, pericolace, or milk of magnesia if needed.    Prescription given for Norco.  Last pain medication given at 0645am .    If your physician has prescribed pain medication that includes Acetaminophen (Tylenol), do not take additional Acetaminophen (Tylenol) while taking the prescribed medication.    Depression / Suicide Risk    As you are discharged from this [facility] facility, it is important to learn how to keep safe from harming yourself.    Recognize the warning signs:  · Abrupt changes in personality, positive or negative- including increase in energy   · Giving away possessions  · Change in eating patterns- significant weight changes-  positive or negative  · Change in sleeping patterns- unable to sleep or sleeping all the time   · Unwillingness or inability to communicate  · Depression  · Unusual sadness, discouragement and loneliness  · Talk of wanting to die  · Neglect of personal appearance   · Rebelliousness- reckless behavior  · Withdrawal from people/activities they love  · Confusion- inability to  concentrate     If you or a loved one observes any of these behaviors or has concerns about self-harm, here's what you can do:  · Talk about it- your feelings and reasons for harming yourself  · Remove any means that you might use to hurt yourself (examples: pills, rope, extension cords, firearm)  · Get professional help from the community (Mental Health, Substance Abuse, psychological counseling)  · Do not be alone:Call your Safe Contact- someone whom you trust who will be there for you.  · Call your local CRISIS HOTLINE 159-8883 or 113-178-9641  · Call your local Children's Mobile Crisis Response Team [place] (142) 796-1448 or www.[employer]  · Call the toll free National Suicide Prevention Hotlines   · National Suicide Prevention Lifeline 760-124-QPYJ (1571)  · National Hope Line Network 800-SUICIDE (202-6083)

## 2018-09-25 ENCOUNTER — IMMUNIZATION (OUTPATIENT)
Dept: OCCUPATIONAL MEDICINE | Facility: CLINIC | Age: 53
End: 2018-09-25

## 2018-09-25 DIAGNOSIS — Z23 NEED FOR VACCINATION: ICD-10-CM

## 2018-09-25 PROCEDURE — 90686 IIV4 VACC NO PRSV 0.5 ML IM: CPT | Performed by: PREVENTIVE MEDICINE

## 2018-10-18 DIAGNOSIS — K44.9 ESOPHAGEAL HIATAL HERNIA: ICD-10-CM

## 2018-10-18 DIAGNOSIS — Z87.19 HISTORY OF GASTRITIS: ICD-10-CM

## 2018-10-18 DIAGNOSIS — K31.A0 INTESTINAL METAPLASIA OF GASTRIC MUCOSA: ICD-10-CM

## 2018-10-18 DIAGNOSIS — R10.13 EPIGASTRIC ABDOMINAL PAIN: ICD-10-CM

## 2018-10-19 ENCOUNTER — TELEPHONE (OUTPATIENT)
Dept: MEDICAL GROUP | Facility: MEDICAL CENTER | Age: 53
End: 2018-10-19

## 2018-10-19 NOTE — TELEPHONE ENCOUNTER
"Pt sent over her FMLA forms to be completed. She wrote in a note,     \"Dr. Raya, I have missed three days this year regarding my stomach issues. I could have taken more but I didn't want to get in trouble for calling out sick. The last 2 weeks, I have been having a lot of pain, not able to eat much at all. Living on crackers and hot tea jsut about. Not being able to sleep very well with the pain, but I am getting through it.\"  "

## 2018-10-21 NOTE — TELEPHONE ENCOUNTER
Call patient and was unable to connect.  I left a message.  Told patient I was forwarding her FMLA forms today.  Also that her medication list did not include an acid lowering medication.  In view of her biopsies last year which showed chronic though inactive gastritis with foci of intestinal metaplasia I feel that she needs to be on an acid lowering agent.  I have recommended ranitidine 150 twice daily or a proton pump inhibitor.  I have also recommended that she be sure to follow-up with GI as soon as possible.  The FMLA forms are faxed today to [phone]

## 2018-10-24 ENCOUNTER — HOSPITAL ENCOUNTER (OUTPATIENT)
Dept: LAB | Facility: MEDICAL CENTER | Age: 53
End: 2018-10-24
Attending: OBSTETRICS & GYNECOLOGY
Payer: COMMERCIAL

## 2018-10-24 PROCEDURE — 88175 CYTOPATH C/V AUTO FLUID REDO: CPT

## 2018-10-25 LAB — CYTOLOGY REG CYTOL: NORMAL

## 2019-02-04 ENCOUNTER — TELEPHONE (OUTPATIENT)
Dept: MEDICAL GROUP | Facility: MEDICAL CENTER | Age: 54
End: 2019-02-04

## 2019-02-04 NOTE — TELEPHONE ENCOUNTER
Please let them know that I would be happy to cosign what ever the radiologist would recommend.  I am sometimes confused over the purpose of a with and without study.

## 2019-02-04 NOTE — TELEPHONE ENCOUNTER
Sona from [facility] CT dept. Called needing clarification on patient's order. They normally do a scan with contrast. She wants to know if there's a reason for requesting with/without. Also, if you would like to add the abdomen. Ct Ext. [phone]

## 2019-02-08 ENCOUNTER — HOSPITAL ENCOUNTER (OUTPATIENT)
Dept: RADIOLOGY | Facility: MEDICAL CENTER | Age: 54
End: 2019-02-08
Attending: FAMILY MEDICINE
Payer: COMMERCIAL

## 2019-02-08 DIAGNOSIS — G89.29 CHRONIC FEMALE PELVIC PAIN: ICD-10-CM

## 2019-02-08 DIAGNOSIS — R10.2 CHRONIC FEMALE PELVIC PAIN: ICD-10-CM

## 2019-02-08 PROCEDURE — 700117 HCHG RX CONTRAST REV CODE 255: Performed by: FAMILY MEDICINE

## 2019-02-08 PROCEDURE — 74177 CT ABD & PELVIS W/CONTRAST: CPT

## 2019-02-08 RX ADMIN — IOHEXOL 100 ML: 350 INJECTION, SOLUTION INTRAVENOUS at 15:28

## 2019-02-08 RX ADMIN — IOHEXOL 50 ML: 240 INJECTION, SOLUTION INTRATHECAL; INTRAVASCULAR; INTRAVENOUS; ORAL at 15:28

## 2019-02-28 ENCOUNTER — OFFICE VISIT (OUTPATIENT)
Dept: MEDICAL GROUP | Facility: MEDICAL CENTER | Age: 54
End: 2019-02-28
Payer: COMMERCIAL

## 2019-02-28 VITALS
OXYGEN SATURATION: 97 % | WEIGHT: 152 LBS | HEIGHT: 68 IN | SYSTOLIC BLOOD PRESSURE: 122 MMHG | DIASTOLIC BLOOD PRESSURE: 82 MMHG | TEMPERATURE: 97.6 F | BODY MASS INDEX: 23.04 KG/M2 | HEART RATE: 69 BPM | RESPIRATION RATE: 16 BRPM

## 2019-02-28 DIAGNOSIS — Z87.19 HISTORY OF GASTRITIS: ICD-10-CM

## 2019-02-28 DIAGNOSIS — K31.7 GASTRIC POLYPS: ICD-10-CM

## 2019-02-28 DIAGNOSIS — K59.01 SLOW TRANSIT CONSTIPATION: ICD-10-CM

## 2019-02-28 PROCEDURE — 99213 OFFICE O/P EST LOW 20 MIN: CPT | Performed by: FAMILY MEDICINE

## 2019-02-28 RX ORDER — PRASTERONE 6.5 MG/1
INSERT VAGINAL
COMMUNITY
Start: 2019-02-25 | End: 2019-09-05

## 2019-02-28 RX ORDER — RANITIDINE 150 MG/1
150 TABLET ORAL 2 TIMES DAILY
Qty: 60 TAB | Refills: 11 | COMMUNITY
Start: 2019-02-28 | End: 2019-09-05

## 2019-02-28 NOTE — PROGRESS NOTES
"Chief Complaint   Patient presents with   • Constipation   • Abdominal Pain     epigastic       Subjective:     HPI:   Monica Manzo presents today with the followin. Slow transit constipation  The pelvic pain resolved with passage of a lot of stool.  She has not been eating as much fiber.  Discussed increasing fiber daily.      2. Gastric polyps/History of gastritis  History of gastritis and gastric polyps.  This is a continued source of pain.  She is not on an H2 blocker or PPI.  I have asked her to start the H2 blocker daily again.      Patient Active Problem List    Diagnosis Date Noted   • Slow transit constipation 2019   • Intestinal metaplasia of gastric mucosa    • Neoplasm of uncertain behavior of ovary 2013   • History of colon polyps 2012   • History of gastritis 2012   • Esophageal hiatal hernia 2011   • Family history of early CAD 2011   • History of iron deficiency anemia    • History of hepatitis C    • Hiatal hernia 2009   • Gastric polyps 2009   • History of hypothyroidism 2009       Current medicines (including changes today)  Current Outpatient Prescriptions   Medication Sig Dispense Refill   • raNITidine (ZANTAC) 150 MG Tab Take 1 Tab by mouth 2 times a day. 60 Tab 11   • INTRAROSA 6.5 MG INSERT      • VITAMIN E PO Take 1 Tab by mouth every day.     • Ascorbic Acid (VITAMIN C PO) Take 1 Tab by mouth every day.       No current facility-administered medications for this visit.        Allergies   Allergen Reactions   • Morphine Vomiting       ROS: As per HPI       Objective:     Blood pressure 122/82, pulse 69, temperature 36.4 °C (97.6 °F), resp. rate 16, height 1.715 m (5' 7.5\"), weight 68.9 kg (152 lb), SpO2 97 %. Body mass index is 23.46 kg/m².    Physical Exam:  Constitutional: Well-developed and well-nourished. Not diaphoretic. No distress. Lucid and fluent.  Skin: Skin is warm and dry. No rash noted.  Head: Atraumatic without " lesions.  Eyes: Conjunctivae and extraocular motions are normal. Pupils are equal, round, and reactive to light. No scleral icterus.   Mouth/Throat: Tongue normal. Oropharynx is clear and moist. Posterior pharynx without erythema or exudates.  Neck: Supple, trachea midline. No thyromegaly present. No cervical or supraclavicular lymphadenopathy. No JVD or carotid bruits appreciated  Cardiovascular: Regular rate and rhythm.  Normal S1, S2 without murmur appreciated.  Chest: Effort normal. Clear to auscultation throughout. No adventitious sounds.   Abdomen: Soft, eipgastrium tender, and without distention. Active bowel sounds in all four quadrants. No rebound, guarding, masses or hepatosplenomegaly.  Extremities: No cyanosis, clubbing, erythema, nor edema.   Neurological: Alert and oriented x 3. No tremor noted.  Psychiatric:  Behavior, mood, and affect are appropriate.    8/2018 CBC normal.     Assessment and Plan:     53 y.o. female with the following issues:    1. Slow transit constipation     2. Gastric polyps  raNITidine (ZANTAC) 150 MG Tab   3. History of gastritis  raNITidine (ZANTAC) 150 MG Tab         Followup: Return in about 6 months (around 8/28/2019), or if symptoms worsen or fail to improve.

## 2019-05-01 DIAGNOSIS — R10.13 EPIGASTRIC ABDOMINAL PAIN: ICD-10-CM

## 2019-05-01 DIAGNOSIS — K44.9 ESOPHAGEAL HIATAL HERNIA: ICD-10-CM

## 2019-05-01 DIAGNOSIS — K44.9 HIATAL HERNIA: ICD-10-CM

## 2019-05-01 DIAGNOSIS — K31.7 GASTRIC POLYPS: ICD-10-CM

## 2019-06-06 DIAGNOSIS — D22.30 CHANGE IN FACIAL MOLE: ICD-10-CM

## 2019-06-06 DIAGNOSIS — L98.9 SKIN LESION OF LEFT LEG: ICD-10-CM

## 2019-06-06 DIAGNOSIS — D22.9 CHANGE IN COLOR OF SKIN MOLE: ICD-10-CM

## 2019-08-31 ENCOUNTER — HOSPITAL ENCOUNTER (OUTPATIENT)
Dept: LAB | Facility: MEDICAL CENTER | Age: 54
End: 2019-08-31
Payer: COMMERCIAL

## 2019-08-31 LAB
BDY FAT % MEASURED: 28.5 %
BP DIAS: 80 MMHG
BP SYS: 117 MMHG
CHOLEST SERPL-MCNC: 194 MG/DL (ref 100–199)
DIABETES HTDIA: NO
EVENT NAME HTEVT: NORMAL
FASTING HTFAS: YES
GLUCOSE SERPL-MCNC: 84 MG/DL (ref 65–99)
HDLC SERPL-MCNC: 69 MG/DL
HYPERTENSION HTHYP: NO
LDLC SERPL CALC-MCNC: 107 MG/DL
SCREENING LOC CITY HTCIT: NORMAL
SCREENING LOC STATE HTSTA: NORMAL
SCREENING LOCATION HTLOC: NORMAL
SMOKING HTSMO: NO
SUBSCRIBER ID HTSID: NORMAL
TRIGL SERPL-MCNC: 88 MG/DL (ref 0–149)

## 2019-08-31 PROCEDURE — S5190 WELLNESS ASSESSMENT BY NONPH: HCPCS

## 2019-08-31 PROCEDURE — 80061 LIPID PANEL: CPT

## 2019-08-31 PROCEDURE — 36415 COLL VENOUS BLD VENIPUNCTURE: CPT

## 2019-08-31 PROCEDURE — 82947 ASSAY GLUCOSE BLOOD QUANT: CPT

## 2019-09-05 DIAGNOSIS — Z01.812 PRE-OPERATIVE LABORATORY EXAMINATION: ICD-10-CM

## 2019-09-05 LAB
ANION GAP SERPL CALC-SCNC: 9 MMOL/L (ref 0–11.9)
APPEARANCE UR: CLEAR
BACTERIA #/AREA URNS HPF: NEGATIVE /HPF
BASOPHILS # BLD AUTO: 0.6 % (ref 0–1.8)
BASOPHILS # BLD: 0.05 K/UL (ref 0–0.12)
BILIRUB UR QL STRIP.AUTO: NEGATIVE
BUN SERPL-MCNC: 22 MG/DL (ref 8–22)
CALCIUM SERPL-MCNC: 9.5 MG/DL (ref 8.5–10.5)
CHLORIDE SERPL-SCNC: 107 MMOL/L (ref 96–112)
CO2 SERPL-SCNC: 25 MMOL/L (ref 20–33)
COLOR UR: YELLOW
CREAT SERPL-MCNC: 1.24 MG/DL (ref 0.5–1.4)
EOSINOPHIL # BLD AUTO: 0.13 K/UL (ref 0–0.51)
EOSINOPHIL NFR BLD: 1.6 % (ref 0–6.9)
EPI CELLS #/AREA URNS HPF: NEGATIVE /HPF
ERYTHROCYTE [DISTWIDTH] IN BLOOD BY AUTOMATED COUNT: 44.9 FL (ref 35.9–50)
GLUCOSE SERPL-MCNC: 95 MG/DL (ref 65–99)
GLUCOSE UR STRIP.AUTO-MCNC: NEGATIVE MG/DL
HCG SERPL QL: NEGATIVE
HCT VFR BLD AUTO: 46.7 % (ref 37–47)
HGB BLD-MCNC: 14.8 G/DL (ref 12–16)
HYALINE CASTS #/AREA URNS LPF: ABNORMAL /LPF
IMM GRANULOCYTES # BLD AUTO: 0.02 K/UL (ref 0–0.11)
IMM GRANULOCYTES NFR BLD AUTO: 0.2 % (ref 0–0.9)
KETONES UR STRIP.AUTO-MCNC: NEGATIVE MG/DL
LEUKOCYTE ESTERASE UR QL STRIP.AUTO: NEGATIVE
LYMPHOCYTES # BLD AUTO: 2.41 K/UL (ref 1–4.8)
LYMPHOCYTES NFR BLD: 28.9 % (ref 22–41)
MCH RBC QN AUTO: 30.1 PG (ref 27–33)
MCHC RBC AUTO-ENTMCNC: 31.7 G/DL (ref 33.6–35)
MCV RBC AUTO: 95.1 FL (ref 81.4–97.8)
MICRO URNS: ABNORMAL
MONOCYTES # BLD AUTO: 0.49 K/UL (ref 0–0.85)
MONOCYTES NFR BLD AUTO: 5.9 % (ref 0–13.4)
NEUTROPHILS # BLD AUTO: 5.23 K/UL (ref 2–7.15)
NEUTROPHILS NFR BLD: 62.8 % (ref 44–72)
NITRITE UR QL STRIP.AUTO: NEGATIVE
NRBC # BLD AUTO: 0 K/UL
NRBC BLD-RTO: 0 /100 WBC
PH UR STRIP.AUTO: 6 [PH] (ref 5–8)
PLATELET # BLD AUTO: 218 K/UL (ref 164–446)
PMV BLD AUTO: 10 FL (ref 9–12.9)
POTASSIUM SERPL-SCNC: 4.9 MMOL/L (ref 3.6–5.5)
PROT UR QL STRIP: NEGATIVE MG/DL
RBC # BLD AUTO: 4.91 M/UL (ref 4.2–5.4)
RBC # URNS HPF: ABNORMAL /HPF
RBC UR QL AUTO: ABNORMAL
SODIUM SERPL-SCNC: 141 MMOL/L (ref 135–145)
SP GR UR STRIP.AUTO: 1.02
UROBILINOGEN UR STRIP.AUTO-MCNC: 0.2 MG/DL
WBC # BLD AUTO: 8.3 K/UL (ref 4.8–10.8)
WBC #/AREA URNS HPF: ABNORMAL /HPF

## 2019-09-05 PROCEDURE — 81001 URINALYSIS AUTO W/SCOPE: CPT

## 2019-09-05 PROCEDURE — 80048 BASIC METABOLIC PNL TOTAL CA: CPT

## 2019-09-05 PROCEDURE — 85025 COMPLETE CBC W/AUTO DIFF WBC: CPT

## 2019-09-05 PROCEDURE — 84703 CHORIONIC GONADOTROPIN ASSAY: CPT

## 2019-09-05 PROCEDURE — 36415 COLL VENOUS BLD VENIPUNCTURE: CPT

## 2019-09-11 ENCOUNTER — ANESTHESIA (OUTPATIENT)
Dept: SURGERY | Facility: MEDICAL CENTER | Age: 54
End: 2019-09-11
Payer: COMMERCIAL

## 2019-09-11 ENCOUNTER — ANESTHESIA EVENT (OUTPATIENT)
Dept: SURGERY | Facility: MEDICAL CENTER | Age: 54
End: 2019-09-11
Payer: COMMERCIAL

## 2019-09-11 ENCOUNTER — HOSPITAL ENCOUNTER (OUTPATIENT)
Facility: MEDICAL CENTER | Age: 54
End: 2019-09-11
Attending: OBSTETRICS & GYNECOLOGY | Admitting: OBSTETRICS & GYNECOLOGY
Payer: COMMERCIAL

## 2019-09-11 VITALS
BODY MASS INDEX: 23.19 KG/M2 | HEART RATE: 71 BPM | RESPIRATION RATE: 17 BRPM | OXYGEN SATURATION: 96 % | HEIGHT: 68 IN | SYSTOLIC BLOOD PRESSURE: 104 MMHG | WEIGHT: 153 LBS | DIASTOLIC BLOOD PRESSURE: 60 MMHG | TEMPERATURE: 97.4 F

## 2019-09-11 LAB — PATHOLOGY CONSULT NOTE: NORMAL

## 2019-09-11 PROCEDURE — 160046 HCHG PACU - 1ST 60 MINS PHASE II: Performed by: OBSTETRICS & GYNECOLOGY

## 2019-09-11 PROCEDURE — 700111 HCHG RX REV CODE 636 W/ 250 OVERRIDE (IP): Performed by: ANESTHESIOLOGY

## 2019-09-11 PROCEDURE — 700101 HCHG RX REV CODE 250: Performed by: ANESTHESIOLOGY

## 2019-09-11 PROCEDURE — 160035 HCHG PACU - 1ST 60 MINS PHASE I: Performed by: OBSTETRICS & GYNECOLOGY

## 2019-09-11 PROCEDURE — A4338 INDWELLING CATHETER LATEX: HCPCS | Performed by: OBSTETRICS & GYNECOLOGY

## 2019-09-11 PROCEDURE — 88305 TISSUE EXAM BY PATHOLOGIST: CPT

## 2019-09-11 PROCEDURE — 160041 HCHG SURGERY MINUTES - EA ADDL 1 MIN LEVEL 4: Performed by: OBSTETRICS & GYNECOLOGY

## 2019-09-11 PROCEDURE — A9270 NON-COVERED ITEM OR SERVICE: HCPCS | Performed by: ANESTHESIOLOGY

## 2019-09-11 PROCEDURE — 160047 HCHG PACU  - EA ADDL 30 MINS PHASE II: Performed by: OBSTETRICS & GYNECOLOGY

## 2019-09-11 PROCEDURE — 160025 RECOVERY II MINUTES (STATS): Performed by: OBSTETRICS & GYNECOLOGY

## 2019-09-11 PROCEDURE — 160009 HCHG ANES TIME/MIN: Performed by: OBSTETRICS & GYNECOLOGY

## 2019-09-11 PROCEDURE — 501582 HCHG TROCAR, THRD BLADED: Performed by: OBSTETRICS & GYNECOLOGY

## 2019-09-11 PROCEDURE — 700102 HCHG RX REV CODE 250 W/ 637 OVERRIDE(OP): Performed by: ANESTHESIOLOGY

## 2019-09-11 PROCEDURE — 501838 HCHG SUTURE GENERAL: Performed by: OBSTETRICS & GYNECOLOGY

## 2019-09-11 PROCEDURE — 160002 HCHG RECOVERY MINUTES (STAT): Performed by: OBSTETRICS & GYNECOLOGY

## 2019-09-11 PROCEDURE — 700101 HCHG RX REV CODE 250: Performed by: OBSTETRICS & GYNECOLOGY

## 2019-09-11 PROCEDURE — A6402 STERILE GAUZE <= 16 SQ IN: HCPCS | Performed by: OBSTETRICS & GYNECOLOGY

## 2019-09-11 PROCEDURE — 160029 HCHG SURGERY MINUTES - 1ST 30 MINS LEVEL 4: Performed by: OBSTETRICS & GYNECOLOGY

## 2019-09-11 PROCEDURE — 500886 HCHG PACK, LAPAROSCOPY: Performed by: OBSTETRICS & GYNECOLOGY

## 2019-09-11 PROCEDURE — 700105 HCHG RX REV CODE 258: Performed by: OBSTETRICS & GYNECOLOGY

## 2019-09-11 PROCEDURE — 160048 HCHG OR STATISTICAL LEVEL 1-5: Performed by: OBSTETRICS & GYNECOLOGY

## 2019-09-11 RX ORDER — HYDROMORPHONE HYDROCHLORIDE 1 MG/ML
0.1 INJECTION, SOLUTION INTRAMUSCULAR; INTRAVENOUS; SUBCUTANEOUS
Status: DISCONTINUED | OUTPATIENT
Start: 2019-09-11 | End: 2019-09-11 | Stop reason: HOSPADM

## 2019-09-11 RX ORDER — ONDANSETRON 2 MG/ML
4 INJECTION INTRAMUSCULAR; INTRAVENOUS
Status: COMPLETED | OUTPATIENT
Start: 2019-09-11 | End: 2019-09-11

## 2019-09-11 RX ORDER — DEXAMETHASONE SODIUM PHOSPHATE 4 MG/ML
INJECTION, SOLUTION INTRA-ARTICULAR; INTRALESIONAL; INTRAMUSCULAR; INTRAVENOUS; SOFT TISSUE PRN
Status: DISCONTINUED | OUTPATIENT
Start: 2019-09-11 | End: 2019-09-11 | Stop reason: SURG

## 2019-09-11 RX ORDER — ACETAMINOPHEN 500 MG
1000 TABLET ORAL ONCE
Status: COMPLETED | OUTPATIENT
Start: 2019-09-11 | End: 2019-09-11

## 2019-09-11 RX ORDER — GABAPENTIN 300 MG/1
300 CAPSULE ORAL ONCE
Status: COMPLETED | OUTPATIENT
Start: 2019-09-11 | End: 2019-09-11

## 2019-09-11 RX ORDER — LIDOCAINE HYDROCHLORIDE 20 MG/ML
INJECTION, SOLUTION EPIDURAL; INFILTRATION; INTRACAUDAL; PERINEURAL PRN
Status: DISCONTINUED | OUTPATIENT
Start: 2019-09-11 | End: 2019-09-11 | Stop reason: SURG

## 2019-09-11 RX ORDER — OXYCODONE HCL 5 MG/5 ML
10 SOLUTION, ORAL ORAL
Status: COMPLETED | OUTPATIENT
Start: 2019-09-11 | End: 2019-09-11

## 2019-09-11 RX ORDER — PROMETHAZINE HYDROCHLORIDE 25 MG/1
12.5 SUPPOSITORY RECTAL EVERY 4 HOURS PRN
Status: DISCONTINUED | OUTPATIENT
Start: 2019-09-11 | End: 2019-09-11 | Stop reason: HOSPADM

## 2019-09-11 RX ORDER — SCOLOPAMINE TRANSDERMAL SYSTEM 1 MG/1
PATCH, EXTENDED RELEASE TRANSDERMAL PRN
Status: DISCONTINUED | OUTPATIENT
Start: 2019-09-11 | End: 2019-09-11 | Stop reason: SURG

## 2019-09-11 RX ORDER — SODIUM CHLORIDE, SODIUM LACTATE, POTASSIUM CHLORIDE, CALCIUM CHLORIDE 600; 310; 30; 20 MG/100ML; MG/100ML; MG/100ML; MG/100ML
INJECTION, SOLUTION INTRAVENOUS CONTINUOUS
Status: DISCONTINUED | OUTPATIENT
Start: 2019-09-11 | End: 2019-09-11

## 2019-09-11 RX ORDER — SODIUM CHLORIDE, SODIUM LACTATE, POTASSIUM CHLORIDE, CALCIUM CHLORIDE 600; 310; 30; 20 MG/100ML; MG/100ML; MG/100ML; MG/100ML
INJECTION, SOLUTION INTRAVENOUS CONTINUOUS
Status: DISCONTINUED | OUTPATIENT
Start: 2019-09-11 | End: 2019-09-11 | Stop reason: HOSPADM

## 2019-09-11 RX ORDER — LIDOCAINE HYDROCHLORIDE 40 MG/ML
SOLUTION TOPICAL PRN
Status: DISCONTINUED | OUTPATIENT
Start: 2019-09-11 | End: 2019-09-11 | Stop reason: SURG

## 2019-09-11 RX ORDER — SCOLOPAMINE TRANSDERMAL SYSTEM 1 MG/1
PATCH, EXTENDED RELEASE TRANSDERMAL
Status: COMPLETED
Start: 2019-09-11 | End: 2019-09-11

## 2019-09-11 RX ORDER — ONDANSETRON 2 MG/ML
INJECTION INTRAMUSCULAR; INTRAVENOUS PRN
Status: DISCONTINUED | OUTPATIENT
Start: 2019-09-11 | End: 2019-09-11 | Stop reason: SURG

## 2019-09-11 RX ORDER — HYDROMORPHONE HYDROCHLORIDE 1 MG/ML
0.4 INJECTION, SOLUTION INTRAMUSCULAR; INTRAVENOUS; SUBCUTANEOUS
Status: DISCONTINUED | OUTPATIENT
Start: 2019-09-11 | End: 2019-09-11 | Stop reason: HOSPADM

## 2019-09-11 RX ORDER — MIDAZOLAM HYDROCHLORIDE 1 MG/ML
INJECTION INTRAMUSCULAR; INTRAVENOUS PRN
Status: DISCONTINUED | OUTPATIENT
Start: 2019-09-11 | End: 2019-09-11 | Stop reason: SURG

## 2019-09-11 RX ORDER — MEPERIDINE HYDROCHLORIDE 25 MG/ML
6.25 INJECTION INTRAMUSCULAR; INTRAVENOUS; SUBCUTANEOUS
Status: DISCONTINUED | OUTPATIENT
Start: 2019-09-11 | End: 2019-09-11 | Stop reason: HOSPADM

## 2019-09-11 RX ORDER — HYDROMORPHONE HYDROCHLORIDE 1 MG/ML
0.2 INJECTION, SOLUTION INTRAMUSCULAR; INTRAVENOUS; SUBCUTANEOUS
Status: DISCONTINUED | OUTPATIENT
Start: 2019-09-11 | End: 2019-09-11 | Stop reason: HOSPADM

## 2019-09-11 RX ORDER — HALOPERIDOL 5 MG/ML
1 INJECTION INTRAMUSCULAR
Status: DISCONTINUED | OUTPATIENT
Start: 2019-09-11 | End: 2019-09-11 | Stop reason: HOSPADM

## 2019-09-11 RX ORDER — OXYCODONE HCL 5 MG/5 ML
5 SOLUTION, ORAL ORAL
Status: COMPLETED | OUTPATIENT
Start: 2019-09-11 | End: 2019-09-11

## 2019-09-11 RX ORDER — ROCURONIUM BROMIDE 10 MG/ML
INJECTION, SOLUTION INTRAVENOUS PRN
Status: DISCONTINUED | OUTPATIENT
Start: 2019-09-11 | End: 2019-09-11 | Stop reason: SURG

## 2019-09-11 RX ORDER — CELECOXIB 200 MG/1
200 CAPSULE ORAL ONCE
Status: COMPLETED | OUTPATIENT
Start: 2019-09-11 | End: 2019-09-11

## 2019-09-11 RX ORDER — DIPHENHYDRAMINE HYDROCHLORIDE 50 MG/ML
12.5 INJECTION INTRAMUSCULAR; INTRAVENOUS
Status: DISCONTINUED | OUTPATIENT
Start: 2019-09-11 | End: 2019-09-11 | Stop reason: HOSPADM

## 2019-09-11 RX ADMIN — DEXAMETHASONE SODIUM PHOSPHATE 8 MG: 4 INJECTION, SOLUTION INTRA-ARTICULAR; INTRALESIONAL; INTRAMUSCULAR; INTRAVENOUS; SOFT TISSUE at 07:43

## 2019-09-11 RX ADMIN — SCOPOLAMINE 1 PATCH: 1 PATCH, EXTENDED RELEASE TRANSDERMAL at 07:15

## 2019-09-11 RX ADMIN — LIDOCAINE HYDROCHLORIDE 2 ML: 20 INJECTION, SOLUTION EPIDURAL; INFILTRATION; INTRACAUDAL at 07:36

## 2019-09-11 RX ADMIN — OXYCODONE HYDROCHLORIDE 10 MG: 5 SOLUTION ORAL at 08:58

## 2019-09-11 RX ADMIN — FENTANYL CITRATE 100 MCG: 50 INJECTION, SOLUTION INTRAMUSCULAR; INTRAVENOUS at 07:36

## 2019-09-11 RX ADMIN — GABAPENTIN 300 MG: 300 CAPSULE ORAL at 06:54

## 2019-09-11 RX ADMIN — SUGAMMADEX 150 MG: 100 INJECTION, SOLUTION INTRAVENOUS at 08:09

## 2019-09-11 RX ADMIN — PROPOFOL 150 MG: 10 INJECTION, EMULSION INTRAVENOUS at 07:37

## 2019-09-11 RX ADMIN — ONDANSETRON 4 MG: 2 INJECTION INTRAMUSCULAR; INTRAVENOUS at 07:59

## 2019-09-11 RX ADMIN — MIDAZOLAM 2 MG: 1 INJECTION INTRAMUSCULAR; INTRAVENOUS at 07:30

## 2019-09-11 RX ADMIN — SODIUM CHLORIDE, POTASSIUM CHLORIDE, SODIUM LACTATE AND CALCIUM CHLORIDE: 600; 310; 30; 20 INJECTION, SOLUTION INTRAVENOUS at 06:35

## 2019-09-11 RX ADMIN — LIDOCAINE HYDROCHLORIDE 3 ML: 40 SOLUTION TOPICAL at 07:37

## 2019-09-11 RX ADMIN — CELECOXIB 200 MG: 200 CAPSULE ORAL at 06:54

## 2019-09-11 RX ADMIN — ONDANSETRON 4 MG: 2 INJECTION INTRAMUSCULAR; INTRAVENOUS at 11:52

## 2019-09-11 RX ADMIN — FENTANYL CITRATE 50 MCG: 50 INJECTION, SOLUTION INTRAMUSCULAR; INTRAVENOUS at 08:58

## 2019-09-11 RX ADMIN — ROCURONIUM BROMIDE 50 MG: 10 INJECTION, SOLUTION INTRAVENOUS at 07:37

## 2019-09-11 RX ADMIN — ACETAMINOPHEN 1000 MG: 500 TABLET ORAL at 06:54

## 2019-09-11 NOTE — OP REPORT
DATE OF SERVICE:  09/11/2019    PROCEDURE PERFORMED:  Operative laparoscopy with right oophorectomy.    PREOPERATIVE DIAGNOSES:  Chronic right pelvic pain.  Rule out right ovarian   endometrioma.    POSTOPERATIVE DIAGNOSES:  Chronic right pelvic pain.  Rule out right ovarian   endometrioma.    SURGEON:  Clinton Bui MD    ASSISTANT SURGEON:  Andreea Maldonado MD    ANESTHESIOLOGIST:  Elio Wells MD    ANESTHESIA:  General endotracheal anesthesia.    RECOGNIZED COMPLICATIONS:  None.    BLOOD LOSS:  Less than 5 mL    FINDINGS:  Ultimately essentially normal appearing right ovary and normal left   ovary, both mobile and free.  The right ovary was definitely more attached on   the right side wall than the left.  I could see the ureter in its normal   anatomical site.  It was noted intruded upon during the surgery.  The appendix   appeared normal.  The upper abdominal contents looked normal.  There were no   signs of injury or trauma on placement of a Wilbert cannula at open laparoscopy   and the remainder of the anterior and posterior cul-de-sacs were clear.    TECHNIQUE:  The patient was brought to the operating room.  Once anesthesia   was secured, exam under anesthesia revealed no pelvic pathology.  I could not   appreciate any fixation or mass effect.  The patient's perineum, vagina and   abdomen were prepped and draped in normal sterile fashion.  She was not   catheterized as she voided preoperatively.  She was not given any intravenous   antibiotics.  I placed a sponge and ring forceps in the vagina for   manipulation, performed a laparoscopy, placed Wilbert cannula and then placed a   second 5 mm upper cannula in the midline under direct vision.  Findings as   described.  I could see the right ureter moving clearly in its normal site.  I   elevated the right ovary and I could identify clearly the right ovarian   artery and vein.  I grasped it or at least 3 cm from the ureter and   sequentially cauterized  it and freed up the entire right ovary.  It was then   removed from the operative field through the umbilicus.  I then reinflated the   abdomen carefully, inspected the pelvis, complete hemostasis, no bleeding.    No recognized complications.  I discontinued the operation.  She was awoken,   extubated, and transferred to the recovery room.  I previously closed the   umbilical fascia with a running 0 Vicryl material and a 4-0 Dexon subcuticular   sites.  I placed sterile dressings and I removed the sponge and the ring   forceps from the vagina.       ____________________________________     RUFUS CHAIREZ MD    HRP / NTS    DD:  09/11/2019 09:48:31  DT:  09/11/2019 11:08:09    D#:  [id number]  Job#:  [id number]    cc: LAURA LAIRD MD

## 2019-09-11 NOTE — DISCHARGE INSTRUCTIONS
ACTIVITY: Rest and take it easy for the first 24 hours.  A responsible adult is recommended to remain with you during that time.  It is normal to feel sleepy.  We encourage you to not do anything that requires balance, judgment or coordination.    MILD FLU-LIKE SYMPTOMS ARE NORMAL. YOU MAY EXPERIENCE GENERALIZED MUSCLE ACHES, THROAT IRRITATION, HEADACHE AND/OR SOME NAUSEA.    FOR 24 HOURS DO NOT:  Drive, operate machinery or run household appliances.  Drink beer or alcoholic beverages.   Make important decisions or sign legal documents.    SPECIAL INSTRUCTIONS: *SEE PELVISCOPY INSTRUCTION SHEET**    DIET: To avoid nausea, slowly advance diet as tolerated, avoiding spicy or greasy foods for the first day.  Add more substantial food to your diet according to your physician's instructions.  Babies can be fed formula or breast milk as soon as they are hungry.  INCREASE FLUIDS AND FIBER TO AVOID CONSTIPATION.    SURGICAL DRESSING/BATHING: *MAY SHOWER TOMORROW.  NO TUB BATHS, HOT TUBS OR SWIMMING UNTIL APPROVED BY PHYSICIAN **    FOLLOW-UP APPOINTMENT:  A follow-up appointment should be arranged with your doctor in *FOLLOW UP WITH DR. CHAIREZ IN 1 WEEK**; call to schedule.    You should CALL YOUR PHYSICIAN if you develop:  Fever greater than 101 degrees F.  Pain not relieved by medication, or persistent nausea or vomiting.  Excessive bleeding (blood soaking through dressing) or unexpected drainage from the wound.  Extreme redness or swelling around the incision site, drainage of pus or foul smelling drainage.  Inability to urinate or empty your bladder within 8 hours.  Problems with breathing or chest pain.    You should call 911 if you develop problems with breathing or chest pain.  If you are unable to contact your doctor or surgical center, you should go to the nearest emergency room or urgent care center.  Physician's telephone #: *DR. CHAIREZ [phone]**    If any questions arise, call your doctor.  If your doctor  is not available, please feel free to call the Surgical Center at [phone].  The Center is open Monday through Friday from 7AM to 7PM.  You can also call the HEALTH HOTLINE open 24 hours/day, 7 days/week and speak to a nurse at [phone], or toll free at [phone].    A registered nurse may call you a few days after your surgery to see how you are doing after your procedure.    MEDICATIONS: Resume taking daily medication.  Take prescribed pain medication with food.  If no medication is prescribed, you may take non-aspirin pain medication if needed.  PAIN MEDICATION CAN BE VERY CONSTIPATING.  Take a stool softener or laxative such as senokot, pericolace, or milk of magnesia if needed.    Prescription given for *NORCO**.  Last pain medication given at **_____9:00 AM  NEXT DOSE DUE AT 1:00 PM______________________________*.    If your physician has prescribed pain medication that includes Acetaminophen (Tylenol), do not take additional Acetaminophen (Tylenol) while taking the prescribed medication.    Depression / Suicide Risk    As you are discharged from this [facility] facility, it is important to learn how to keep safe from harming yourself.    Recognize the warning signs:  · Abrupt changes in personality, positive or negative- including increase in energy   · Giving away possessions  · Change in eating patterns- significant weight changes-  positive or negative  · Change in sleeping patterns- unable to sleep or sleeping all the time   · Unwillingness or inability to communicate  · Depression  · Unusual sadness, discouragement and loneliness  · Talk of wanting to die  · Neglect of personal appearance   · Rebelliousness- reckless behavior  · Withdrawal from people/activities they love  · Confusion- inability to concentrate     If you or a loved one observes any of these behaviors or has concerns about self-harm, here's what you can do:  · Talk about it- your feelings and reasons for harming  yourself  · Remove any means that you might use to hurt yourself (examples: pills, rope, extension cords, firearm)  · Get professional help from the community (Mental Health, Substance Abuse, psychological counseling)  · Do not be alone:Call your Safe Contact- someone whom you trust who will be there for you.  · Call your local CRISIS HOTLINE 263-8853 or 465-362-9946  · Call your local Children's Mobile Crisis Response Team [place] (407) 510-4519 or www.[employer]  · Call the toll free National Suicide Prevention Hotlines   · National Suicide Prevention Lifeline 038-793-ZLJW (1324)  · National Hope Line Network 800-SUICIDE (783-7282)

## 2019-09-11 NOTE — ANESTHESIA TIME REPORT
Anesthesia Start and Stop Event Times     Date Time Event    9/11/2019 0720 Ready for Procedure     0729 Anesthesia Start     0825 Anesthesia Stop        Responsible Staff  09/11/19    Name Role Begin End    Elio Wells M.D. Anesth 0729 0825        Preop Diagnosis (Free Text):  Pre-op Diagnosis     PELVIC PAIN, OVARY NEOPLASM UNCERT RIGHT OVARY        Preop Diagnosis (Codes):    Post op Diagnosis  Pelvic pain      Premium Reason  Non-Premium    Comments:

## 2019-09-11 NOTE — OR NURSING
0823 RECEIVED PATIENT FROM OR.  REPORT FROM DR. LYON.  ORAL AIRWAY IN PLACE.  RESPIRATIONS ARE EVEN AND UNLABORED.  UMBILICAL DRESSING IS CDI.  SUPRA PUBIC BAND AID IS CDI.  ROMAINE PAD IN PLACE.      0832  ORAL AIRWAY DC'D WITHOUT DIFFICULTY.    0858  MEDICATED WITH IV AND PO PAIN MEDICATION FOR C/O ABDOMINAL PAIN 7.    0923  IN PHASE 2.    1100   ARACELY TO BEDSIDE.      1152  MEDICATED WITH IV ZOFRAN FOR C/O NAUSEA.    1155  REPORT TO NIKA TOLENTINO.    1235  RECEIVED REPORT FROM NIKA TOLENTINO.  RESUMED CARE OF PATIENT.    1237  UP TO THE BATHROOM.    1253  DISCHARGED.  DISCHARGE INSTRUCTIONS GIVEN TO PATIENT AND HER .  A VERBAL UNDERSTANDING OF ALL INSTRUCTIONS WAS STATED.  PATIENT TAKING PO, VOIDING AND AMBULATING WITHOUT DIFFICULTY.  PATIENT STATES SHE IS READY TO GO HOME.

## 2019-09-11 NOTE — ANESTHESIA PREPROCEDURE EVALUATION
Pelvic Pain    Relevant Problems   PULMONARY   (+) History of hepatitis C      NEURO   (+) History of gastritis   (+) History of hepatitis C   (+) History of hypothyroidism      GI   (+) Hiatal hernia   HCV treated    Physical Exam    Airway   Mallampati: II  TM distance: >3 FB  Neck ROM: full       Cardiovascular - normal exam  Rhythm: regular  Rate: normal  (-) murmur     Dental - normal exam         Pulmonary - normal exam  Breath sounds clear to auscultation     Abdominal    Neurological - normal exam                 Anesthesia Plan    ASA 2       Plan - general       Airway plan will be ETT        Induction: intravenous    Postoperative Plan: Postoperative administration of opioids is intended.    Pertinent diagnostic labs and testing reviewed    Informed Consent:    Anesthetic plan and risks discussed with patient.    Use of blood products discussed with: patient whom consented to blood products.

## 2019-09-11 NOTE — H&P
This 53-year-old woman being admitted for laparoscopic right oophorectomy.    HISTORY OF PRESENT ILLNESS:  This patient had recently presented to me with   ongoing cramping in the area of the right ovary.  She experiences cramping   discomfort on a frequent basis almost but not every day.  She occasionally   gets some nausea with the pain.  There has been no vomiting.  The pain does   not appear to be related to eating habits or bowel or bladder function, which   were normal.  She does not experience a problem with deep dyspareunia.  She   had a previous hysterectomy.  She also had a previous laparoscopic bilateral   salpingectomy because of chronic bilateral hydrosalpinx.  She recently had a   CAT scan done and this revealed evidence of a cyst on the right ovary.  When I   examined in the office pelvic ultrasound revealed a slightly enlarged right   ovary with changes consistent with either endometrioma or possible dermoid   cyst.  For this reason, she is being admitted for removal of same.  I was   unable to visualize the left ovary on ultrasound.  Previous ultrasound in   years gone by, the left ovary appeared normal.  She had a hysterectomy for   benign reasons.  She had no other documented serious urogynecological health   problems.  She specifically does not have a problem with dysuria, hematuria,   urinary frequency, urgency or incontinence of bowel function.  No rectal   bleeding, no blood in the urine, otherwise healthy lady in no major systemic   process.    PHYSICAL EXAMINATION:  GENERAL:  Very healthy looking woman.  She was in no distress.  VITAL SIGNS:  Normal.  Blood pressure 130/72, pulse 70.  ____  HEART:  Normal.  BREASTS:  I did not examine her breasts.  ABDOMEN:  Benign without mass.  There is no real tenderness.  She certainly   has no peritoneal signs.  She has good bowel sounds.  PELVIC:  Bimanual exam does reveal lot of tenderness in the area of the right   ovary, but there is no fixation,  there is no nodularity and there is no   thickening.  EXTREMITIES:  Normal.    The patient is fully counseled as to surgery, what is involved, what she can   expect.  She will make sure she has an effective bowel movement today before   surgery and she present to [facility] 2 hours before the scheduled   time.  All acute and chronic local and generalized issues applicable to this   kind of surgery were addressed.  I answered all her questions.       ____________________________________     RUFUS CHAIREZ MD    HRP / NTS    DD:  09/10/2019 20:18:34  DT:  09/11/2019 00:09:16    D#:  [id number]  Job#:  [id number]

## 2019-09-11 NOTE — ANESTHESIA PROCEDURE NOTES
Airway  Date/Time: 9/11/2019 7:38 AM  Performed by: Elio Wells M.D.  Authorized by: Elio Wells M.D.     Location:  OR  Urgency:  Elective  Difficult Airway: No    Indications for Airway Management:  Anesthesia  Spontaneous Ventilation: absent    Sedation Level:  Deep  Preoxygenated: Yes    Patient Position:  Sniffing  Mask Difficulty Assessment:  2 - vent by mask + OA or adjuvant +/- NMBA  Final Airway Type:  Endotracheal airway  Final Endotracheal Airway:  ETT  Cuffed: Yes    Technique Used for Successful ETT Placement:  Direct laryngoscopy  Insertion Site:  Oral  Blade Type:  Johan  Laryngoscope Blade/Videolaryngoscope Blade Size:  3  ETT Size (mm):  6.5  Measured from:  Teeth  ETT to Teeth (cm):  21  Placement Verified by: auscultation and capnometry    Cormack-Lehane Classification:  Grade I - full view of glottis  Number of Attempts at Approach:  1   Atraumatic DLx1

## 2019-09-24 ENCOUNTER — IMMUNIZATION (OUTPATIENT)
Dept: OCCUPATIONAL MEDICINE | Facility: CLINIC | Age: 54
End: 2019-09-24

## 2019-09-24 DIAGNOSIS — Z23 NEED FOR VACCINATION: ICD-10-CM

## 2019-09-26 PROCEDURE — 90686 IIV4 VACC NO PRSV 0.5 ML IM: CPT | Performed by: PREVENTIVE MEDICINE

## 2019-11-21 ENCOUNTER — HOSPITAL ENCOUNTER (OUTPATIENT)
Facility: MEDICAL CENTER | Age: 54
End: 2019-11-21
Attending: OBSTETRICS & GYNECOLOGY
Payer: COMMERCIAL

## 2019-11-21 ENCOUNTER — HOSPITAL ENCOUNTER (OUTPATIENT)
Dept: LAB | Facility: MEDICAL CENTER | Age: 54
End: 2019-11-21
Attending: OBSTETRICS & GYNECOLOGY
Payer: COMMERCIAL

## 2019-11-21 PROCEDURE — 88175 CYTOPATH C/V AUTO FLUID REDO: CPT

## 2019-11-22 LAB — CYTOLOGY REG CYTOL: NORMAL

## 2020-07-24 DIAGNOSIS — R10.13 EPIGASTRIC ABDOMINAL PAIN: ICD-10-CM

## 2020-08-12 ENCOUNTER — APPOINTMENT (OUTPATIENT)
Dept: RADIOLOGY | Facility: MEDICAL CENTER | Age: 55
End: 2020-08-12
Attending: FAMILY MEDICINE

## 2020-08-12 DIAGNOSIS — Z12.31 VISIT FOR SCREENING MAMMOGRAM: ICD-10-CM

## 2020-12-07 ENCOUNTER — OFFICE VISIT (OUTPATIENT)
Dept: MEDICAL GROUP | Facility: MEDICAL CENTER | Age: 55
End: 2020-12-07
Payer: OTHER MISCELLANEOUS

## 2020-12-07 VITALS
OXYGEN SATURATION: 98 % | SYSTOLIC BLOOD PRESSURE: 110 MMHG | TEMPERATURE: 98.1 F | WEIGHT: 155.8 LBS | HEART RATE: 75 BPM | DIASTOLIC BLOOD PRESSURE: 70 MMHG | BODY MASS INDEX: 23.61 KG/M2 | HEIGHT: 68 IN

## 2020-12-07 DIAGNOSIS — Z00.00 ROUTINE GENERAL MEDICAL EXAMINATION AT A HEALTH CARE FACILITY: ICD-10-CM

## 2020-12-07 DIAGNOSIS — Z00.00 LABORATORY EXAMINATION ORDERED AS PART OF A ROUTINE GENERAL MEDICAL EXAMINATION: ICD-10-CM

## 2020-12-07 DIAGNOSIS — Z23 NEED FOR VACCINATION: ICD-10-CM

## 2020-12-07 PROCEDURE — 99396 PREV VISIT EST AGE 40-64: CPT | Mod: 25 | Performed by: FAMILY MEDICINE

## 2020-12-07 PROCEDURE — 90471 IMMUNIZATION ADMIN: CPT | Performed by: FAMILY MEDICINE

## 2020-12-07 PROCEDURE — 90686 IIV4 VACC NO PRSV 0.5 ML IM: CPT | Performed by: FAMILY MEDICINE

## 2020-12-07 ASSESSMENT — ENCOUNTER SYMPTOMS
FEVER: 0
CONSTIPATION: 1
DIZZINESS: 0
BLOOD IN STOOL: 0
SPEECH CHANGE: 0
SPUTUM PRODUCTION: 0
DEPRESSION: 0
TINGLING: 0
SORE THROAT: 0
SHORTNESS OF BREATH: 0
FOCAL WEAKNESS: 0
BACK PAIN: 0
WEAKNESS: 0
INSOMNIA: 0
DIARRHEA: 0
HEARTBURN: 1
MEMORY LOSS: 0
WHEEZING: 0
NECK PAIN: 0
ORTHOPNEA: 0
SEIZURES: 0
CHILLS: 0
HEADACHES: 0
TREMORS: 0
NAUSEA: 0
COUGH: 0
VOMITING: 0
LOSS OF CONSCIOUSNESS: 0
BLURRED VISION: 0
SENSORY CHANGE: 0
HALLUCINATIONS: 0
BRUISES/BLEEDS EASILY: 0
NERVOUS/ANXIOUS: 0
MYALGIAS: 0
ABDOMINAL PAIN: 1
WEIGHT LOSS: 0
PALPITATIONS: 0
DOUBLE VISION: 0

## 2020-12-07 ASSESSMENT — PATIENT HEALTH QUESTIONNAIRE - PHQ9: CLINICAL INTERPRETATION OF PHQ2 SCORE: 0

## 2020-12-07 ASSESSMENT — LIFESTYLE VARIABLES: SUBSTANCE_ABUSE: 0

## 2020-12-07 NOTE — PROGRESS NOTES
Subjective:      Monica Manzo is a 55 y.o. female who presents with Annual Exam        She is here for her general medical examination.    HPI     has a past medical history of Anesthesia, Ankle fracture (12/08), Duodenal ulcer, unspecified as acute or chronic, without hemorrhage, perforation, or obstruction (7/2009), Endometriosis, Gastric polyps, Gastritis (2007), GERD (gastroesophageal reflux disease) (6/5/2009), Gynecological disorder, Hepatitis C (1998), Hiatus hernia syndrome, History of anemia, History of hepatitis C (1996), History of hypothyroidism, Intestinal metaplasia of gastric mucosa, Intestinal metaplasia of gastric mucosa, and PONV (postoperative nausea and vomiting).   has a past surgical history that includes hysterectomy, vaginal; liver biopsy (4/17/08); tonsillectomy; pippa by laparoscopy (4/17/08); pr anesth,surg breast reconstructive (12/15/2010); nissen fundoplication laparoscopic (7/6/2011); pr reduction of large breast; hiatal hernia repair; abdominal hysterectomy total; pelviscopy (4/22/2013); pelviscopy (1/29/2014); lysis adhesions gyn (1/29/2014); ankle orif (12/14/2008); ankle orif (12/08); hardware removal ortho (Right, 9/11/2018); pr lap,diagnostic abdomen (N/A, 9/11/2019); and salpingo oophorectomy (Right, 9/11/2019).  family history includes Breast Cancer in her mother; Cancer (age of onset: 45) in her mother; Diabetes in her sister; Heart Disease in her father.   reports that she has never smoked. She has never used smokeless tobacco. She reports that she does not drink alcohol or use drugs.      Current Outpatient Medications:   •  FIBER PO, Take 2 Doses by mouth every day., Disp: , Rfl:   •  Multiple Vitamin (MULTI-VITAMIN DAILY PO), Take 1 Dose by mouth every day., Disp: , Rfl:   •  Fexofenadine HCl (MUCINEX ALLERGY PO), Take 1 Dose by mouth every day., Disp: , Rfl:   •  VITAMIN E PO, Take 1 Tab by mouth every day., Disp: , Rfl:   •  Ascorbic Acid (VITAMIN C PO), Take 1 Tab  "by mouth every day., Disp: , Rfl:   is allergic to morphine.    Review of Systems   Constitutional: Negative for chills, fever, malaise/fatigue and weight loss.   HENT: Negative for congestion, hearing loss and sore throat.    Eyes: Negative for blurred vision and double vision.   Respiratory: Negative for cough, sputum production, shortness of breath and wheezing.    Cardiovascular: Negative for chest pain, palpitations, orthopnea and leg swelling.   Gastrointestinal: Positive for abdominal pain, constipation and heartburn. Negative for blood in stool, diarrhea, melena, nausea and vomiting.        Still has mild heartburn.  Chronic constipation.   Genitourinary: Negative for dysuria, frequency, hematuria and urgency.   Musculoskeletal: Negative for back pain, joint pain, myalgias and neck pain.   Skin: Negative for rash.   Neurological: Negative for dizziness, tingling, tremors, sensory change, speech change, focal weakness, seizures, loss of consciousness, weakness and headaches.   Endo/Heme/Allergies: Positive for environmental allergies. Does not bruise/bleed easily.        Mild allergies   Psychiatric/Behavioral: Negative for depression, hallucinations, memory loss, substance abuse and suicidal ideas. The patient is not nervous/anxious and does not have insomnia.         Rare mild insomnia          Objective:     /70 (BP Location: Right arm, Patient Position: Sitting, BP Cuff Size: Adult)   Pulse 75   Temp 36.7 °C (98.1 °F) (Temporal)   Ht 1.715 m (5' 7.5\")   Wt 70.7 kg (155 lb 12.8 oz)   SpO2 98%   BMI 24.04 kg/m²      Physical Exam  Vitals signs reviewed.   Constitutional:       Appearance: She is well-developed.   HENT:      Head: Normocephalic and atraumatic.   Eyes:      General:         Left eye: No discharge.      Pupils: Pupils are equal, round, and reactive to light.   Neck:      Musculoskeletal: Normal range of motion and neck supple.      Thyroid: No thyromegaly.      Vascular: No JVD. "   Cardiovascular:      Rate and Rhythm: Normal rate and regular rhythm.      Heart sounds: Normal heart sounds. No murmur.   Pulmonary:      Effort: Pulmonary effort is normal. No respiratory distress.      Breath sounds: Normal breath sounds. No wheezing or rales.   Abdominal:      General: Bowel sounds are normal. There is no distension.      Palpations: Abdomen is soft. There is no mass.      Tenderness: There is no abdominal tenderness.   Musculoskeletal: Normal range of motion.   Lymphadenopathy:      Cervical: No cervical adenopathy.   Skin:     General: Skin is warm and dry.      Findings: No erythema or rash.   Neurological:      Mental Status: She is alert and oriented to person, place, and time.      Coordination: Coordination normal.   Psychiatric:         Behavior: Behavior normal.               Assessment/Plan:        1. Routine general medical examination at a health care facility  She is medically stable and generally well.    2. Laboratory examination ordered as part of a routine general medical examination  Routine orders discussed and placed  - Comp Metabolic Panel; Future  - Lipid Profile; Future  - CBC WITHOUT DIFFERENTIAL; Future    3. Need for vaccination  Administered today  - Influenza Vaccine Quad Injection (PF)

## 2021-01-02 ENCOUNTER — HOSPITAL ENCOUNTER (OUTPATIENT)
Dept: LAB | Facility: MEDICAL CENTER | Age: 56
End: 2021-01-02
Attending: FAMILY MEDICINE
Payer: OTHER MISCELLANEOUS

## 2021-01-02 DIAGNOSIS — Z00.00 LABORATORY EXAMINATION ORDERED AS PART OF A ROUTINE GENERAL MEDICAL EXAMINATION: ICD-10-CM

## 2021-08-19 DIAGNOSIS — K44.9 ESOPHAGEAL HIATAL HERNIA: ICD-10-CM

## 2021-08-19 DIAGNOSIS — Z86.2 HISTORY OF IRON DEFICIENCY ANEMIA: ICD-10-CM

## 2021-08-19 DIAGNOSIS — Z87.19 HISTORY OF GASTRITIS: ICD-10-CM

## 2021-08-19 DIAGNOSIS — Z86.39 HISTORY OF HYPOTHYROIDISM: ICD-10-CM

## 2021-08-19 DIAGNOSIS — E78.00 ELEVATED LDL CHOLESTEROL LEVEL: ICD-10-CM

## 2021-08-19 DIAGNOSIS — K29.50 CHRONIC GASTRITIS WITHOUT BLEEDING, UNSPECIFIED GASTRITIS TYPE: ICD-10-CM

## 2021-08-20 NOTE — PROGRESS NOTES
Lab orders are placed.  I have requested details on the dermatology referral request.  I have also asked her to give us details of her Covid vaccine and to please get the vaccine if she has not.

## 2021-09-03 DIAGNOSIS — L81.9 CHANGE IN PIGMENTED LESION OF FACE: ICD-10-CM

## 2021-09-03 DIAGNOSIS — L81.9 PIGMENTED SKIN LESION: ICD-10-CM

## 2021-09-03 NOTE — PROGRESS NOTES
Patient has a pigmented skin lesion on her face and another on her back.  Both have been enlarging.  Referral to dermatology is discussed and placed.

## 2021-09-16 NOTE — MR AVS SNAPSHOT
"        Monica Manzo   3/20/2017 9:00 AM   Office Visit   MRN: [id number]    Department:  [facility]   Dept Phone:  [phone]    Description:  Female : 1965   Provider:  Naina Raya M.D.           Reason for Visit     Abdominal Pain x 1 week       Allergies as of 3/20/2017     Allergen Noted Reactions    Morphine 2013   Vomiting      You were diagnosed with     Epigastric abdominal pain   [030772]       History of gastrointestinal bleeding   [818510]       History of chronic gastritis   [732576]         Vital Signs     Blood Pressure Pulse Temperature Respirations Height Weight    116/60 mmHg 62 36.4 °C (97.5 °F) 16 1.689 m (5' 6.5\") 72.576 kg (160 lb)    Body Mass Index Oxygen Saturation Smoking Status             25.44 kg/m2 99% Never Smoker          Basic Information     Date Of Birth Sex Race Ethnicity Preferred Language    1965 Female White Non- English      Problem List              ICD-10-CM Priority Class Noted - Resolved    Hiatal hernia K44.9   2009 - Present    Gastric polyps K31.7   2009 - Present    History of hypothyroidism Z86.39   2009 - Present    History of iron deficiency anemia Z86.2   Unknown - Present    History of hepatitis C Z86.19   Unknown - Present    Family history of early CAD Z82.49   3/2/2011 - Present    Esophageal hiatal hernia K44.9   2011 - Present    History of colon polyps Z86.010   2012 - Present    History of gastritis Z87.19   2012 - Present    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   2013 - Present      Health Maintenance        Date Due Completion Dates    COLONOSCOPY 2017 (Prv Comp)    Override on 2012: Previously completed (polyp)    MAMMOGRAM 2017, 2014, 2013, 2012, 2011, 2010, 2009, 2009, 2008, 2008, 2007, 2007, 2006, 2005, 9/15/2004    IMM DTaP/Tdap/Td Vaccine (2 - Td) 2026 " 5/9/2016            Current Immunizations     Hepatitis A Vaccine, Ped/Adol 3/1/2008    Hepatitis B Vaccine Non-Recombivax (Ped/Adol) 3/1/2008    Influenza Vaccine Quad Inj (Pf) 11/9/2016  2:17 PM, 11/11/2015 12:29 PM    Influenza Vaccine Quad Inj (Preserved) 12/23/2014    Tdap Vaccine 5/9/2016    Tetanus Vaccine 3/1/2008      Below and/or attached are the medications your provider expects you to take. Review all of your home medications and newly ordered medications with your provider and/or pharmacist. Follow medication instructions as directed by your provider and/or pharmacist. Please keep your medication list with you and share with your provider. Update the information when medications are discontinued, doses are changed, or new medications (including over-the-counter products) are added; and carry medication information at all times in the event of emergency situations     Allergies:  MORPHINE - Vomiting               Medications  Valid as of: March 20, 2017 - 10:46 AM    Generic Name Brand Name Tablet Size Instructions for use    Ascorbic Acid   Take 1 Tab by mouth every day.        Cholecalciferol   Take 1 Tab by mouth every day.        Omeprazole (CAPSULE DELAYED RELEASE) PRILOSEC 20 MG Take 1 Cap by mouth every day.        Sucralfate (Tab) CARAFATE 1 GM Take 1 Tab by mouth 4 Times a Day,Before Meals and at Bedtime.        Vitamin E   Take 1 Tab by mouth every day.        .                 Medicines prescribed today were sent to:     [facility] [address] [place] [address] (S), [place] - [address] [address] [facility] (S) [place] [address]    Phone: [phone] Fax: [phone]    Open 24 Hours?: No      Medication refill instructions:       If your prescription bottle indicates you have medication refills left, it is not necessary to call your provider’s office. Please contact your pharmacy and they will refill your medication.    If your prescription bottle indicates you do not have any refills left, you may  request refills at any time through one of the following ways: The online [employer] system (except Urgent Care), by calling your provider’s office, or by asking your pharmacy to contact your provider’s office with a refill request. Medication refills are processed only during regular business hours and may not be available until the next business day. Your provider may request additional information or to have a follow-up visit with you prior to refilling your medication.   *Please Note: Medication refills are assigned a new Rx number when refilled electronically. Your pharmacy may indicate that no refills were authorized even though a new prescription for the same medication is available at the pharmacy. Please request the medicine by name with the pharmacy before contacting your provider for a refill.        Your To Do List     Future Labs/Procedures Complete By Expires    BASIC METABOLIC PANEL  As directed 3/21/2018    CBC WITHOUT DIFFERENTIAL  As directed 9/20/2017    DX-UPPER GI-AIR CONTRAST  As directed 9/20/2017    HEPATIC FUNCTION PANEL  As directed 3/21/2018      Referral     A referral request has been sent to our patient care coordination department. Please allow 3-5 business days for us to process this request and contact you either by phone or mail. If you do not hear from us by the 5th business day, please call us at [phone].           [employer] Access Code: Activation code not generated  Current [employer] Status: Active           no

## 2021-10-25 ENCOUNTER — APPOINTMENT (OUTPATIENT)
Dept: RADIOLOGY | Facility: MEDICAL CENTER | Age: 56
End: 2021-10-25
Attending: FAMILY MEDICINE
Payer: OTHER MISCELLANEOUS

## 2021-11-01 ENCOUNTER — APPOINTMENT (OUTPATIENT)
Dept: RADIOLOGY | Facility: MEDICAL CENTER | Age: 56
End: 2021-11-01
Attending: FAMILY MEDICINE
Payer: OTHER MISCELLANEOUS

## 2021-11-15 ENCOUNTER — OFFICE VISIT (OUTPATIENT)
Dept: MEDICAL GROUP | Facility: MEDICAL CENTER | Age: 56
End: 2021-11-15
Payer: OTHER MISCELLANEOUS

## 2021-11-15 VITALS
HEART RATE: 71 BPM | RESPIRATION RATE: 14 BRPM | DIASTOLIC BLOOD PRESSURE: 70 MMHG | OXYGEN SATURATION: 97 % | WEIGHT: 156.31 LBS | HEIGHT: 68 IN | TEMPERATURE: 97.3 F | SYSTOLIC BLOOD PRESSURE: 118 MMHG | BODY MASS INDEX: 23.69 KG/M2

## 2021-11-15 DIAGNOSIS — Z23 NEED FOR VACCINATION: ICD-10-CM

## 2021-11-15 DIAGNOSIS — K44.9 ESOPHAGEAL HIATAL HERNIA: ICD-10-CM

## 2021-11-15 PROCEDURE — 90686 IIV4 VACC NO PRSV 0.5 ML IM: CPT | Performed by: FAMILY MEDICINE

## 2021-11-15 PROCEDURE — 99213 OFFICE O/P EST LOW 20 MIN: CPT | Mod: 25 | Performed by: FAMILY MEDICINE

## 2021-11-15 PROCEDURE — 90471 IMMUNIZATION ADMIN: CPT | Performed by: FAMILY MEDICINE

## 2021-11-15 ASSESSMENT — PATIENT HEALTH QUESTIONNAIRE - PHQ9: CLINICAL INTERPRETATION OF PHQ2 SCORE: 0

## 2021-11-15 NOTE — PROGRESS NOTES
"Chief Complaint   Patient presents with   • Flu Vaccine       Subjective:     HPI:   Monica Manzo presents today with the followin. Need for vaccination  Flu Quad administered today     2. Esophageal hiatal hernia  She still has chronic nausea and GI upset.  She does use omprazole but it does nothing.  Vinegar really hurts her stomach.        Patient Active Problem List    Diagnosis Date Noted   • Slow transit constipation 2019   • Intestinal metaplasia of gastric mucosa    • Neoplasm of uncertain behavior of ovary 2013   • History of colon polyps 2012   • History of gastritis 2012   • Esophageal hiatal hernia 2011   • Family history of early CAD 2011   • History of iron deficiency anemia    • History of hepatitis C    • Hiatal hernia 2009   • Gastric polyps 2009   • History of hypothyroidism 2009       Current medicines (including changes today)  Current Outpatient Medications   Medication Sig Dispense Refill   • FIBER PO Take 2 Doses by mouth every day.     • Multiple Vitamin (MULTI-VITAMIN DAILY PO) Take 1 Dose by mouth every day.     • Fexofenadine HCl (MUCINEX ALLERGY PO) Take 1 Dose by mouth every day.     • VITAMIN E PO Take 1 Tab by mouth every day.     • Ascorbic Acid (VITAMIN C PO) Take 1 Tab by mouth every day.       No current facility-administered medications for this visit.       Allergies   Allergen Reactions   • Morphine Vomiting     bradycardia       ROS: As per HPI       Objective:     /70 (BP Location: Right arm, Patient Position: Sitting, BP Cuff Size: Adult)   Pulse 71   Temp 36.3 °C (97.3 °F) (Temporal)   Resp 14   Ht 1.715 m (5' 7.5\")   Wt 70.9 kg (156 lb 4.9 oz)   SpO2 97%  Body mass index is 24.12 kg/m².    Physical Exam:  Constitutional: Well-developed and well-nourished. Not diaphoretic. No distress. Lucid and fluent.  Patient, physician and staff all wearing masks.  Skin: Skin is warm and dry. No rash " noted.  Head: Atraumatic without lesions.  Eyes: Conjunctivae and extraocular motions are normal. Pupils are equal, round, and reactive to light. No scleral icterus.   Ears:  External ears unremarkable.   Neck: Supple, trachea midline. No thyromegaly present. No cervical or supraclavicular lymphadenopathy. No JVD or carotid bruits appreciated  Cardiovascular: Regular rate and rhythm.  Normal S1, S2 without murmur appreciated.  Chest: Effort normal. Clear to auscultation throughout. No adventitious sounds.   Abdomen: Soft, .minimally tender and without distention.   Extremities: No cyanosis, clubbing, erythema, nor edema.   Neurological: Alert and oriented x 3. No tremor appreciated.  Psychiatric:  Behavior, mood, and affect are appropriate.       Assessment and Plan:     56 y.o. female with the following issues:    1. Need for vaccination  INFLUENZA VACCINE QUAD INJ (PF)   2. Esophageal hiatal hernia           Followup: Return in about 4 months (around 3/15/2022), or if symptoms worsen or fail to improve.

## 2021-11-20 LAB
ALBUMIN SERPL-MCNC: 5 G/DL (ref 3.8–4.9)
ALBUMIN/GLOB SERPL: 2 {RATIO} (ref 1.2–2.2)
ALP SERPL-CCNC: 89 IU/L (ref 44–121)
ALT SERPL-CCNC: 17 IU/L (ref 0–32)
AST SERPL-CCNC: 23 IU/L (ref 0–40)
BILIRUB SERPL-MCNC: 0.4 MG/DL (ref 0–1.2)
BUN SERPL-MCNC: 18 MG/DL (ref 6–24)
BUN/CREAT SERPL: 16 (ref 9–23)
CALCIUM SERPL-MCNC: 10 MG/DL (ref 8.7–10.2)
CHLORIDE SERPL-SCNC: 101 MMOL/L (ref 96–106)
CHOLEST SERPL-MCNC: 230 MG/DL (ref 100–199)
CO2 SERPL-SCNC: 25 MMOL/L (ref 20–29)
CREAT SERPL-MCNC: 1.12 MG/DL (ref 0.57–1)
ERYTHROCYTE [DISTWIDTH] IN BLOOD BY AUTOMATED COUNT: 12.8 % (ref 11.7–15.4)
GLOBULIN SER CALC-MCNC: 2.5 G/DL (ref 1.5–4.5)
GLUCOSE SERPL-MCNC: 90 MG/DL (ref 65–99)
HCT VFR BLD AUTO: 47.8 % (ref 34–46.6)
HDLC SERPL-MCNC: 87 MG/DL
HGB BLD-MCNC: 15.9 G/DL (ref 11.1–15.9)
LABORATORY COMMENT REPORT: ABNORMAL
LDLC SERPL CALC-MCNC: 130 MG/DL (ref 0–99)
MCH RBC QN AUTO: 31.5 PG (ref 26.6–33)
MCHC RBC AUTO-ENTMCNC: 33.3 G/DL (ref 31.5–35.7)
MCV RBC AUTO: 95 FL (ref 79–97)
NRBC BLD AUTO-RTO: ABNORMAL %
PLATELET # BLD AUTO: 243 X10E3/UL (ref 150–450)
POTASSIUM SERPL-SCNC: 4.4 MMOL/L (ref 3.5–5.2)
PROT SERPL-MCNC: 7.5 G/DL (ref 6–8.5)
RBC # BLD AUTO: 5.04 X10E6/UL (ref 3.77–5.28)
SODIUM SERPL-SCNC: 141 MMOL/L (ref 134–144)
TRIGL SERPL-MCNC: 77 MG/DL (ref 0–149)
TSH SERPL DL<=0.005 MIU/L-ACNC: 1.77 UIU/ML (ref 0.45–4.5)
VLDLC SERPL CALC-MCNC: 13 MG/DL (ref 5–40)
WBC # BLD AUTO: 5.4 X10E3/UL (ref 3.4–10.8)

## 2022-03-22 ENCOUNTER — HOSPITAL ENCOUNTER (OUTPATIENT)
Dept: RADIOLOGY | Facility: MEDICAL CENTER | Age: 57
End: 2022-03-22
Attending: FAMILY MEDICINE
Payer: COMMERCIAL

## 2022-03-22 DIAGNOSIS — Z12.31 VISIT FOR SCREENING MAMMOGRAM: ICD-10-CM

## 2022-03-22 PROCEDURE — 77063 BREAST TOMOSYNTHESIS BI: CPT

## 2022-09-02 ENCOUNTER — OFFICE VISIT (OUTPATIENT)
Dept: MEDICAL GROUP | Facility: MEDICAL CENTER | Age: 57
End: 2022-09-02
Payer: COMMERCIAL

## 2022-09-02 VITALS
WEIGHT: 164 LBS | BODY MASS INDEX: 24.86 KG/M2 | HEIGHT: 68 IN | SYSTOLIC BLOOD PRESSURE: 108 MMHG | TEMPERATURE: 97.2 F | OXYGEN SATURATION: 99 % | DIASTOLIC BLOOD PRESSURE: 66 MMHG | HEART RATE: 91 BPM

## 2022-09-02 DIAGNOSIS — R05.9 COUGH: ICD-10-CM

## 2022-09-02 PROCEDURE — 99213 OFFICE O/P EST LOW 20 MIN: CPT | Performed by: FAMILY MEDICINE

## 2022-09-02 ASSESSMENT — PATIENT HEALTH QUESTIONNAIRE - PHQ9: CLINICAL INTERPRETATION OF PHQ2 SCORE: 0

## 2022-09-02 ASSESSMENT — FIBROSIS 4 INDEX: FIB4 SCORE: 1.24

## 2022-09-02 NOTE — ASSESSMENT & PLAN NOTE
No signs and symptoms of infection and do not think any insidious process at this time.  Given that patient improved well and more humid climate do think this is environmental.  Trial of over-the-counter:  - daily allergy medicine (zyrtec, Claritin, allegra)  - nightly steroid nose spray (flonase, fluticasone)   - saline sinus rinses a few times per week (Neilmed, Netipot)  If this is not working we will consider further work-up also possible GI source that we discussed such as silent GERD.

## 2022-09-02 NOTE — PROGRESS NOTES
"Subjective:     CC: \"Cough\"    HPI:   Monica presents today with:    Cough:  - 1st started a 2 weeks before going on Cruise which started on 8/20, went to [facility] and [place] was an 8 day trip  - during the cruise cough subsided  - started back on day after getting back to [place]  - has had times with a \"tickle\" and cough but generally goes a way  - currently has tickle on left side which is typical  - cough is dry, non-productive  - cough is worse at night, wakes up in the middle of the night coughing, will feel tickle first  - does get some nasal congestion  - does have some tightness base of neck in throat, clearing throat does seem to decrease urge to cough  - No fevers, chills, sore throat, headaches, body aches  - no heart burn since hiatal hernia repair  - no difficulty swallowing, voice changes  - no shortness of breath or wheezing  - never smoker        Problem   Cough       Current Outpatient Medications Ordered in Epic   Medication Sig Dispense Refill    FIBER PO Take 2 Doses by mouth every day.      Multiple Vitamin (MULTI-VITAMIN DAILY PO) Take 1 Dose by mouth every day.      VITAMIN E PO Take 1 Tab by mouth every day.      Ascorbic Acid (VITAMIN C PO) Take 1 Tab by mouth every day.       No current [facility]-ordered facility-administered medications on file.       Health Maintenance: Acute visit    ROS:  ROS see HPI    Objective:     Exam:  /66   Pulse 91   Temp 36.2 °C (97.2 °F) (Temporal)   Ht 1.715 m (5' 7.5\")   Wt 74.4 kg (164 lb)   SpO2 99%   BMI 25.31 kg/m²  Body mass index is 25.31 kg/m².    Physical Exam  Vitals reviewed.   Constitutional:       General: She is not in acute distress.     Appearance: Normal appearance. She is not ill-appearing or toxic-appearing.   HENT:      Head: Normocephalic and atraumatic.      Right Ear: There is impacted cerumen.      Left Ear: There is impacted cerumen.      Mouth/Throat:      Mouth: Mucous membranes are moist.      Comments: " [place]  Cardiovascular:      Rate and Rhythm: Normal rate and regular rhythm.      Heart sounds: Normal heart sounds.   Pulmonary:      Effort: Pulmonary effort is normal.      Breath sounds: Normal breath sounds.   Musculoskeletal:      Cervical back: Neck supple. No tenderness.   Lymphadenopathy:      Cervical: No cervical adenopathy.   Skin:     General: Skin is warm and dry.   Neurological:      Mental Status: She is alert. Mental status is at baseline.   Psychiatric:         Mood and Affect: Mood normal.         Behavior: Behavior normal.         Assessment & Plan:     56 y.o. female with the following -     Problem List Items Addressed This Visit       Cough     No signs and symptoms of infection and do not think any insidious process at this time.  Given that patient improved well and more humid climate do think this is environmental.  Trial of over-the-counter:  - daily allergy medicine (zyrtec, Claritin, allegra)  - nightly steroid nose spray (flonase, fluticasone)   - saline sinus rinses a few times per week (Neilmed, Netipot)  If this is not working we will consider further work-up also possible GI source that we discussed such as silent GERD.                Return if symptoms worsen or fail to improve.    Please note that this dictation was created using voice recognition software. I have made every reasonable attempt to correct obvious errors, but I expect that there are errors of grammar and possibly content that I did not discover before finalizing the note.

## 2022-11-29 SDOH — ECONOMIC STABILITY: TRANSPORTATION INSECURITY
IN THE PAST 12 MONTHS, HAS LACK OF TRANSPORTATION KEPT YOU FROM MEETINGS, WORK, OR FROM GETTING THINGS NEEDED FOR DAILY LIVING?: NO

## 2022-11-29 SDOH — ECONOMIC STABILITY: INCOME INSECURITY: IN THE LAST 12 MONTHS, WAS THERE A TIME WHEN YOU WERE NOT ABLE TO PAY THE MORTGAGE OR RENT ON TIME?: NO

## 2022-11-29 SDOH — ECONOMIC STABILITY: HOUSING INSECURITY
IN THE LAST 12 MONTHS, WAS THERE A TIME WHEN YOU DID NOT HAVE A STEADY PLACE TO SLEEP OR SLEPT IN A SHELTER (INCLUDING NOW)?: NO

## 2022-11-29 SDOH — ECONOMIC STABILITY: FOOD INSECURITY: WITHIN THE PAST 12 MONTHS, THE FOOD YOU BOUGHT JUST DIDN'T LAST AND YOU DIDN'T HAVE MONEY TO GET MORE.: NEVER TRUE

## 2022-11-29 SDOH — ECONOMIC STABILITY: TRANSPORTATION INSECURITY
IN THE PAST 12 MONTHS, HAS THE LACK OF TRANSPORTATION KEPT YOU FROM MEDICAL APPOINTMENTS OR FROM GETTING MEDICATIONS?: NO

## 2022-11-29 SDOH — ECONOMIC STABILITY: FOOD INSECURITY: WITHIN THE PAST 12 MONTHS, YOU WORRIED THAT YOUR FOOD WOULD RUN OUT BEFORE YOU GOT MONEY TO BUY MORE.: NEVER TRUE

## 2022-11-29 SDOH — ECONOMIC STABILITY: INCOME INSECURITY: HOW HARD IS IT FOR YOU TO PAY FOR THE VERY BASICS LIKE FOOD, HOUSING, MEDICAL CARE, AND HEATING?: NOT HARD AT ALL

## 2022-11-29 SDOH — HEALTH STABILITY: PHYSICAL HEALTH: ON AVERAGE, HOW MANY MINUTES DO YOU ENGAGE IN EXERCISE AT THIS LEVEL?: 40 MIN

## 2022-11-29 SDOH — ECONOMIC STABILITY: HOUSING INSECURITY: IN THE LAST 12 MONTHS, HOW MANY PLACES HAVE YOU LIVED?: 1

## 2022-11-29 SDOH — HEALTH STABILITY: PHYSICAL HEALTH: ON AVERAGE, HOW MANY DAYS PER WEEK DO YOU ENGAGE IN MODERATE TO STRENUOUS EXERCISE (LIKE A BRISK WALK)?: 3 DAYS

## 2022-11-29 ASSESSMENT — LIFESTYLE VARIABLES
HOW OFTEN DO YOU HAVE A DRINK CONTAINING ALCOHOL: NEVER
HOW MANY STANDARD DRINKS CONTAINING ALCOHOL DO YOU HAVE ON A TYPICAL DAY: PATIENT DOES NOT DRINK
SKIP TO QUESTIONS 9-10: 1
AUDIT-C TOTAL SCORE: 0
HOW OFTEN DO YOU HAVE SIX OR MORE DRINKS ON ONE OCCASION: NEVER

## 2022-11-29 ASSESSMENT — SOCIAL DETERMINANTS OF HEALTH (SDOH)
DO YOU BELONG TO ANY CLUBS OR ORGANIZATIONS SUCH AS CHURCH GROUPS UNIONS, FRATERNAL OR ATHLETIC GROUPS, OR SCHOOL GROUPS?: NO
IN A TYPICAL WEEK, HOW MANY TIMES DO YOU TALK ON THE PHONE WITH FAMILY, FRIENDS, OR NEIGHBORS?: ONCE A WEEK
HOW OFTEN DO YOU GET TOGETHER WITH FRIENDS OR RELATIVES?: TWICE A WEEK
HOW OFTEN DO YOU ATTENT MEETINGS OF THE CLUB OR ORGANIZATION YOU BELONG TO?: PATIENT DECLINED
HOW OFTEN DO YOU ATTEND CHURCH OR RELIGIOUS SERVICES?: PATIENT DECLINED

## 2022-11-30 SDOH — HEALTH STABILITY: MENTAL HEALTH
STRESS IS WHEN SOMEONE FEELS TENSE, NERVOUS, ANXIOUS, OR CAN'T SLEEP AT NIGHT BECAUSE THEIR MIND IS TROUBLED. HOW STRESSED ARE YOU?: NOT AT ALL

## 2022-11-30 SDOH — HEALTH STABILITY: PHYSICAL HEALTH: ON AVERAGE, HOW MANY DAYS PER WEEK DO YOU ENGAGE IN MODERATE TO STRENUOUS EXERCISE (LIKE A BRISK WALK)?: 3 DAYS

## 2022-11-30 SDOH — ECONOMIC STABILITY: TRANSPORTATION INSECURITY
IN THE PAST 12 MONTHS, HAS LACK OF RELIABLE TRANSPORTATION KEPT YOU FROM MEDICAL APPOINTMENTS, MEETINGS, WORK OR FROM GETTING THINGS NEEDED FOR DAILY LIVING?: NO

## 2022-11-30 SDOH — ECONOMIC STABILITY: HOUSING INSECURITY: IN THE LAST 12 MONTHS, HOW MANY PLACES HAVE YOU LIVED?: 1

## 2022-11-30 SDOH — HEALTH STABILITY: PHYSICAL HEALTH: ON AVERAGE, HOW MANY MINUTES DO YOU ENGAGE IN EXERCISE AT THIS LEVEL?: 40 MIN

## 2022-11-30 ASSESSMENT — SOCIAL DETERMINANTS OF HEALTH (SDOH)
WITHIN THE PAST 12 MONTHS, YOU WORRIED THAT YOUR FOOD WOULD RUN OUT BEFORE YOU GOT THE MONEY TO BUY MORE: NEVER TRUE
HOW OFTEN DO YOU HAVE SIX OR MORE DRINKS ON ONE OCCASION: NEVER
HOW HARD IS IT FOR YOU TO PAY FOR THE VERY BASICS LIKE FOOD, HOUSING, MEDICAL CARE, AND HEATING?: NOT HARD AT ALL
HOW MANY DRINKS CONTAINING ALCOHOL DO YOU HAVE ON A TYPICAL DAY WHEN YOU ARE DRINKING: PATIENT DOES NOT DRINK
DO YOU BELONG TO ANY CLUBS OR ORGANIZATIONS SUCH AS CHURCH GROUPS UNIONS, FRATERNAL OR ATHLETIC GROUPS, OR SCHOOL GROUPS?: NO
HOW OFTEN DO YOU HAVE A DRINK CONTAINING ALCOHOL: NEVER
HOW OFTEN DO YOU ATTENT MEETINGS OF THE CLUB OR ORGANIZATION YOU BELONG TO?: PATIENT DECLINED
HOW OFTEN DO YOU GET TOGETHER WITH FRIENDS OR RELATIVES?: TWICE A WEEK
IN A TYPICAL WEEK, HOW MANY TIMES DO YOU TALK ON THE PHONE WITH FAMILY, FRIENDS, OR NEIGHBORS?: ONCE A WEEK
HOW OFTEN DO YOU ATTEND CHURCH OR RELIGIOUS SERVICES?: PATIENT DECLINED

## 2022-12-09 ENCOUNTER — OFFICE VISIT (OUTPATIENT)
Dept: MEDICAL GROUP | Facility: MEDICAL CENTER | Age: 57
End: 2022-12-09
Payer: COMMERCIAL

## 2022-12-09 VITALS
TEMPERATURE: 96.9 F | RESPIRATION RATE: 14 BRPM | OXYGEN SATURATION: 97 % | HEART RATE: 74 BPM | DIASTOLIC BLOOD PRESSURE: 64 MMHG | SYSTOLIC BLOOD PRESSURE: 108 MMHG | HEIGHT: 68 IN | BODY MASS INDEX: 24.98 KG/M2 | WEIGHT: 164.8 LBS

## 2022-12-09 DIAGNOSIS — Z00.00 LABORATORY EXAMINATION ORDERED AS PART OF A ROUTINE GENERAL MEDICAL EXAMINATION: ICD-10-CM

## 2022-12-09 DIAGNOSIS — Z23 NEED FOR VACCINATION: ICD-10-CM

## 2022-12-09 DIAGNOSIS — Z00.00 ROUTINE GENERAL MEDICAL EXAMINATION AT A HEALTH CARE FACILITY: ICD-10-CM

## 2022-12-09 PROCEDURE — 99396 PREV VISIT EST AGE 40-64: CPT | Mod: 25 | Performed by: FAMILY MEDICINE

## 2022-12-09 PROCEDURE — 90686 IIV4 VACC NO PRSV 0.5 ML IM: CPT | Performed by: FAMILY MEDICINE

## 2022-12-09 PROCEDURE — 90471 IMMUNIZATION ADMIN: CPT | Performed by: FAMILY MEDICINE

## 2022-12-09 ASSESSMENT — ENCOUNTER SYMPTOMS
BLOOD IN STOOL: 0
ABDOMINAL PAIN: 0
FOCAL WEAKNESS: 0
FEVER: 0
NERVOUS/ANXIOUS: 0
BACK PAIN: 0
CHILLS: 0
COUGH: 0
BLURRED VISION: 0
HEARTBURN: 0
SHORTNESS OF BREATH: 0
HEADACHES: 0
TREMORS: 0
MEMORY LOSS: 0
ORTHOPNEA: 0
DIARRHEA: 0
HALLUCINATIONS: 0
CONSTIPATION: 0
SENSORY CHANGE: 0
SPEECH CHANGE: 0
VOMITING: 0
BRUISES/BLEEDS EASILY: 0
DEPRESSION: 0
NAUSEA: 0
DIAPHORESIS: 0
DIZZINESS: 0
INSOMNIA: 0
SORE THROAT: 0
TINGLING: 0
WEAKNESS: 0
WEIGHT LOSS: 0
DOUBLE VISION: 0
SEIZURES: 0
MYALGIAS: 0
LOSS OF CONSCIOUSNESS: 0
SPUTUM PRODUCTION: 0
WHEEZING: 0
PALPITATIONS: 0
NECK PAIN: 0

## 2022-12-09 ASSESSMENT — FIBROSIS 4 INDEX: FIB4 SCORE: 1.26

## 2022-12-09 ASSESSMENT — LIFESTYLE VARIABLES: SUBSTANCE_ABUSE: 0

## 2022-12-09 NOTE — PROGRESS NOTES
Subjective     Monica Manzo is a 57 y.o. female who presents with Annual Exam        She is here for annual medical examination.    HPI     has a past medical history of Anesthesia, Ankle fracture (12/08), Duodenal ulcer, unspecified as acute or chronic, without hemorrhage, perforation, or obstruction (7/2009), Endometriosis, Gastric polyps, Gastritis (2007), GERD (gastroesophageal reflux disease) (6/5/2009), Gynecological disorder, Hepatitis C (1998), Hiatus hernia syndrome, History of anemia, History of hepatitis C (1996), History of hypothyroidism, Intestinal metaplasia of gastric mucosa, Intestinal metaplasia of gastric mucosa, and PONV (postoperative nausea and vomiting).   has a past surgical history that includes hysterectomy, vaginal; liver biopsy (4/17/08); tonsillectomy; pippa by laparoscopy (4/17/08); pr anesth,surg breast reconstructive (12/15/2010); nissen fundoplication laparoscopic (7/6/2011); pr breast reduction; hiatal hernia repair; abdominal hysterectomy total; pelviscopy (4/22/2013); pelviscopy (1/29/2014); lysis adhesions gyn (1/29/2014); orif, ankle (12/14/2008); orif, ankle (12/08); hardware removal ortho (Right, 9/11/2018); pr lap,diagnostic abdomen (N/A, 9/11/2019); and salpingo oophorectomy (Right, 9/11/2019).  family history includes Breast Cancer in her mother; Cancer (age of onset: 45) in her mother; Diabetes in her sister; Heart Disease in her father.   reports that she has never smoked. She has never used smokeless tobacco. She reports that she does not drink alcohol and does not use drugs.      Current Outpatient Medications:     FIBER PO, Take 2 Doses by mouth every day., Disp: , Rfl:     Multiple Vitamin (MULTI-VITAMIN DAILY PO), Take 1 Dose by mouth every day., Disp: , Rfl:     VITAMIN E PO, Take 1 Tab by mouth every day., Disp: , Rfl:     Ascorbic Acid (VITAMIN C PO), Take 1 Tab by mouth every day., Disp: , Rfl:   is allergic to morphine.    Review of Systems  "  Constitutional:  Negative for chills, diaphoresis, fever, malaise/fatigue and weight loss.   HENT:  Negative for congestion, hearing loss, sore throat and tinnitus.    Eyes:  Negative for blurred vision and double vision.   Respiratory:  Negative for cough, sputum production, shortness of breath and wheezing.    Cardiovascular:  Negative for chest pain, palpitations, orthopnea and leg swelling.   Gastrointestinal:  Negative for abdominal pain, blood in stool, constipation, diarrhea, heartburn, nausea and vomiting.   Genitourinary:  Negative for dysuria, frequency, hematuria and urgency.   Musculoskeletal:  Negative for back pain, joint pain, myalgias and neck pain.   Skin:  Negative for rash.   Neurological:  Negative for dizziness, tingling, tremors, sensory change, speech change, focal weakness, seizures, loss of consciousness, weakness and headaches.   Endo/Heme/Allergies:  Positive for environmental allergies. Does not bruise/bleed easily.   Psychiatric/Behavioral:  Negative for depression, hallucinations, memory loss, substance abuse and suicidal ideas. The patient is not nervous/anxious and does not have insomnia.             Objective     /64 (BP Location: Right arm, Patient Position: Sitting, BP Cuff Size: Small adult)   Pulse 74   Temp 36.1 °C (96.9 °F) (Temporal)   Resp 14   Ht 1.715 m (5' 7.5\")   Wt 74.8 kg (164 lb 12.8 oz)   SpO2 97%   BMI 25.43 kg/m²      Physical Exam  Vitals reviewed.   Constitutional:       Appearance: She is well-developed.   HENT:      Head: Normocephalic and atraumatic.   Eyes:      General:         Left eye: No discharge.      Pupils: Pupils are equal, round, and reactive to light.   Neck:      Thyroid: No thyromegaly.      Vascular: No JVD.   Cardiovascular:      Rate and Rhythm: Normal rate and regular rhythm.      Heart sounds: Normal heart sounds. No murmur heard.  Pulmonary:      Effort: Pulmonary effort is normal. No respiratory distress.      Breath sounds: " Normal breath sounds. No wheezing or rales.   Abdominal:      General: Bowel sounds are normal. There is no distension.      Palpations: Abdomen is soft. There is no mass.      Tenderness: There is no abdominal tenderness.   Musculoskeletal:         General: Normal range of motion.      Cervical back: Normal range of motion and neck supple.      Comments: 2 cm lipoma right medial thigh, also 1/2 cm small fibrous nodule, possibly lipoma adjacent.   Lymphadenopathy:      Cervical: No cervical adenopathy.   Skin:     General: Skin is warm and dry.      Findings: No erythema or rash.   Neurological:      Mental Status: She is alert and oriented to person, place, and time.      Coordination: Coordination normal.   Psychiatric:         Mood and Affect: Mood normal.         Behavior: Behavior normal.        Anticipatory guidance; mammogram due in March, colonoscopy due 2028, immunizations up to date, discussed. Flu vaccine given today.  Will consider COVID booster 5/2023.  Consider shigrix vaccine, discussed.  Wears seat belts always.      Assessment & Plan        1. Routine general medical examination at a health care facility  She is generally well and medically stable.    2. Laboratory examination ordered as part of a routine general medical examination  Routine orders discussed and placed  - Comp Metabolic Panel; Future  - Lipid Profile; Future  - TSH; Future  - CBC WITHOUT DIFFERENTIAL; Future    3. Need for vaccination  Flu vaccine administered today  - INFLUENZA VACCINE QUAD INJ (PF)     Recheck one year, sooner as needed

## 2022-12-31 LAB
ALBUMIN SERPL-MCNC: 4.6 G/DL (ref 3.8–4.9)
ALBUMIN/GLOB SERPL: 1.8 {RATIO} (ref 1.2–2.2)
ALP SERPL-CCNC: 86 IU/L (ref 44–121)
ALT SERPL-CCNC: 14 IU/L (ref 0–32)
AST SERPL-CCNC: 19 IU/L (ref 0–40)
BILIRUB SERPL-MCNC: 0.4 MG/DL (ref 0–1.2)
BUN SERPL-MCNC: 18 MG/DL (ref 6–24)
BUN/CREAT SERPL: 16 (ref 9–23)
CALCIUM SERPL-MCNC: 9.7 MG/DL (ref 8.7–10.2)
CHLORIDE SERPL-SCNC: 103 MMOL/L (ref 96–106)
CHOLEST SERPL-MCNC: 216 MG/DL (ref 100–199)
CO2 SERPL-SCNC: 25 MMOL/L (ref 20–29)
CREAT SERPL-MCNC: 1.11 MG/DL (ref 0.57–1)
EGFRCR SERPLBLD CKD-EPI 2021: 59 ML/MIN/1.73
ERYTHROCYTE [DISTWIDTH] IN BLOOD BY AUTOMATED COUNT: 12.7 % (ref 11.7–15.4)
GLOBULIN SER CALC-MCNC: 2.6 G/DL (ref 1.5–4.5)
GLUCOSE SERPL-MCNC: 92 MG/DL (ref 70–99)
HCT VFR BLD AUTO: 47 % (ref 34–46.6)
HDLC SERPL-MCNC: 74 MG/DL
HGB BLD-MCNC: 15.4 G/DL (ref 11.1–15.9)
LABORATORY COMMENT REPORT: ABNORMAL
LDLC SERPL CALC-MCNC: 128 MG/DL (ref 0–99)
MCH RBC QN AUTO: 31 PG (ref 26.6–33)
MCHC RBC AUTO-ENTMCNC: 32.8 G/DL (ref 31.5–35.7)
MCV RBC AUTO: 95 FL (ref 79–97)
NRBC BLD AUTO-RTO: ABNORMAL %
PLATELET # BLD AUTO: 212 X10E3/UL (ref 150–450)
POTASSIUM SERPL-SCNC: 4.6 MMOL/L (ref 3.5–5.2)
PROT SERPL-MCNC: 7.2 G/DL (ref 6–8.5)
RBC # BLD AUTO: 4.97 X10E6/UL (ref 3.77–5.28)
SODIUM SERPL-SCNC: 142 MMOL/L (ref 134–144)
TRIGL SERPL-MCNC: 80 MG/DL (ref 0–149)
TSH SERPL DL<=0.005 MIU/L-ACNC: 1.37 UIU/ML (ref 0.45–4.5)
VLDLC SERPL CALC-MCNC: 14 MG/DL (ref 5–40)
WBC # BLD AUTO: 4.4 X10E3/UL (ref 3.4–10.8)

## 2023-03-28 ENCOUNTER — HOSPITAL ENCOUNTER (OUTPATIENT)
Dept: RADIOLOGY | Facility: MEDICAL CENTER | Age: 58
End: 2023-03-28
Attending: FAMILY MEDICINE
Payer: COMMERCIAL

## 2023-03-28 DIAGNOSIS — Z12.31 VISIT FOR SCREENING MAMMOGRAM: ICD-10-CM

## 2023-03-28 PROCEDURE — 77063 BREAST TOMOSYNTHESIS BI: CPT

## 2023-06-27 NOTE — ANESTHESIA POSTPROCEDURE EVALUATION
Patient: Monica Manzo    Procedure Summary     Date:  09/11/19 Room / Location:  [facility] ROOM 21 / SURGERY SAME DAY [facility]    Anesthesia Start:  0729 Anesthesia Stop:  0825    Procedures:       PELVISCOPY (N/A Pelvis)      SALPINGO-OOPHORECTOMY (Right Pelvis) Diagnosis:  (PELVIC PAIN, OVARY NEOPLASM UNCERT RIGHT OVARY)    Surgeon:  Clinton Bui M.D. Responsible Provider:  Elio Wells M.D.    Anesthesia Type:  general ASA Status:  2          Final Anesthesia Type: general  Last vitals  BP   Blood Pressure: (!) 96/53    Temp   36.3 °C (97.4 °F)    Pulse   Pulse: 61   Resp   (!) 9    SpO2   96 %      Anesthesia Post Evaluation    Patient location during evaluation: PACU  Patient participation: complete - patient participated  Level of consciousness: awake and alert    Airway patency: patent  Anesthetic complications: no  Cardiovascular status: hemodynamically stable  Respiratory status: acceptable  Hydration status: euvolemic    PONV: none           Nurse Pain Score: 0 (NPRS)         dentures/eyeglasses

## 2023-09-06 DIAGNOSIS — Z86.39 HISTORY OF HYPOTHYROIDISM: ICD-10-CM

## 2023-09-06 DIAGNOSIS — K59.01 SLOW TRANSIT CONSTIPATION: ICD-10-CM

## 2023-09-06 DIAGNOSIS — Z86.2 HISTORY OF IRON DEFICIENCY ANEMIA: ICD-10-CM

## 2023-09-06 DIAGNOSIS — K44.9 ESOPHAGEAL HIATAL HERNIA: ICD-10-CM

## 2023-09-06 DIAGNOSIS — K31.7 GASTRIC POLYPS: ICD-10-CM

## 2023-09-06 DIAGNOSIS — Z86.19 HISTORY OF HEPATITIS C: ICD-10-CM

## 2023-09-06 DIAGNOSIS — Z87.19 HISTORY OF GASTRITIS: ICD-10-CM

## 2023-09-06 DIAGNOSIS — E78.5 DYSLIPIDEMIA: ICD-10-CM

## 2023-10-31 LAB
25(OH)D3+25(OH)D2 SERPL-MCNC: 66.7 NG/ML (ref 30–100)
ALBUMIN SERPL-MCNC: 4.4 G/DL (ref 3.8–4.9)
ALBUMIN/GLOB SERPL: 1.7 {RATIO} (ref 1.2–2.2)
ALP SERPL-CCNC: 70 IU/L (ref 44–121)
ALT SERPL-CCNC: 19 IU/L (ref 0–32)
AST SERPL-CCNC: 18 IU/L (ref 0–40)
BASOPHILS # BLD AUTO: 0 X10E3/UL (ref 0–0.2)
BASOPHILS NFR BLD AUTO: 1 %
BILIRUB SERPL-MCNC: 0.4 MG/DL (ref 0–1.2)
BUN SERPL-MCNC: 16 MG/DL (ref 6–24)
BUN/CREAT SERPL: 16 (ref 9–23)
CALCIUM SERPL-MCNC: 9.6 MG/DL (ref 8.7–10.2)
CHLORIDE SERPL-SCNC: 103 MMOL/L (ref 96–106)
CHOLEST SERPL-MCNC: 181 MG/DL (ref 100–199)
CO2 SERPL-SCNC: 25 MMOL/L (ref 20–29)
CREAT SERPL-MCNC: 1.03 MG/DL (ref 0.57–1)
EGFRCR SERPLBLD CKD-EPI 2021: 64 ML/MIN/1.73
EOSINOPHIL # BLD AUTO: 0.1 X10E3/UL (ref 0–0.4)
EOSINOPHIL NFR BLD AUTO: 1 %
ERYTHROCYTE [DISTWIDTH] IN BLOOD BY AUTOMATED COUNT: 12.3 % (ref 11.7–15.4)
GLOBULIN SER CALC-MCNC: 2.6 G/DL (ref 1.5–4.5)
GLUCOSE SERPL-MCNC: 83 MG/DL (ref 70–99)
HCT VFR BLD AUTO: 45.4 % (ref 34–46.6)
HDLC SERPL-MCNC: 69 MG/DL
HGB BLD-MCNC: 14.9 G/DL (ref 11.1–15.9)
IMM GRANULOCYTES # BLD AUTO: 0 X10E3/UL (ref 0–0.1)
IMM GRANULOCYTES NFR BLD AUTO: 0 %
IMMATURE CELLS  115398: NORMAL
LABORATORY COMMENT REPORT: NORMAL
LDLC SERPL CALC-MCNC: 98 MG/DL (ref 0–99)
LYMPHOCYTES # BLD AUTO: 1.7 X10E3/UL (ref 0.7–3.1)
LYMPHOCYTES NFR BLD AUTO: 35 %
MCH RBC QN AUTO: 31.7 PG (ref 26.6–33)
MCHC RBC AUTO-ENTMCNC: 32.8 G/DL (ref 31.5–35.7)
MCV RBC AUTO: 97 FL (ref 79–97)
MONOCYTES # BLD AUTO: 0.4 X10E3/UL (ref 0.1–0.9)
MONOCYTES NFR BLD AUTO: 8 %
MORPHOLOGY BLD-IMP: NORMAL
NEUTROPHILS # BLD AUTO: 2.7 X10E3/UL (ref 1.4–7)
NEUTROPHILS NFR BLD AUTO: 55 %
NRBC BLD AUTO-RTO: NORMAL %
PLATELET # BLD AUTO: 216 X10E3/UL (ref 150–450)
POTASSIUM SERPL-SCNC: 5.1 MMOL/L (ref 3.5–5.2)
PROT SERPL-MCNC: 7 G/DL (ref 6–8.5)
RBC # BLD AUTO: 4.7 X10E6/UL (ref 3.77–5.28)
SODIUM SERPL-SCNC: 140 MMOL/L (ref 134–144)
TRIGL SERPL-MCNC: 75 MG/DL (ref 0–149)
TSH SERPL DL<=0.005 MIU/L-ACNC: 1.4 UIU/ML (ref 0.45–4.5)
VLDLC SERPL CALC-MCNC: 14 MG/DL (ref 5–40)
WBC # BLD AUTO: 4.9 X10E3/UL (ref 3.4–10.8)

## 2023-11-06 ENCOUNTER — OFFICE VISIT (OUTPATIENT)
Dept: MEDICAL GROUP | Facility: MEDICAL CENTER | Age: 58
End: 2023-11-06
Payer: COMMERCIAL

## 2023-11-06 VITALS
SYSTOLIC BLOOD PRESSURE: 102 MMHG | OXYGEN SATURATION: 96 % | TEMPERATURE: 98 F | RESPIRATION RATE: 16 BRPM | HEIGHT: 68 IN | BODY MASS INDEX: 21.43 KG/M2 | WEIGHT: 141.4 LBS | HEART RATE: 90 BPM | DIASTOLIC BLOOD PRESSURE: 60 MMHG

## 2023-11-06 DIAGNOSIS — Z23 NEED FOR IMMUNIZATION AGAINST INFLUENZA: ICD-10-CM

## 2023-11-06 DIAGNOSIS — Z00.00 ROUTINE GENERAL MEDICAL EXAMINATION AT A HEALTH CARE FACILITY: ICD-10-CM

## 2023-11-06 DIAGNOSIS — Z00.00 LABORATORY EXAMINATION ORDERED AS PART OF A ROUTINE GENERAL MEDICAL EXAMINATION: ICD-10-CM

## 2023-11-06 PROCEDURE — 90471 IMMUNIZATION ADMIN: CPT | Performed by: FAMILY MEDICINE

## 2023-11-06 PROCEDURE — 90686 IIV4 VACC NO PRSV 0.5 ML IM: CPT | Performed by: FAMILY MEDICINE

## 2023-11-06 PROCEDURE — 3074F SYST BP LT 130 MM HG: CPT | Performed by: FAMILY MEDICINE

## 2023-11-06 PROCEDURE — 99396 PREV VISIT EST AGE 40-64: CPT | Mod: 25 | Performed by: FAMILY MEDICINE

## 2023-11-06 PROCEDURE — 3078F DIAST BP <80 MM HG: CPT | Performed by: FAMILY MEDICINE

## 2023-11-06 ASSESSMENT — ENCOUNTER SYMPTOMS
DIARRHEA: 0
HEADACHES: 0
TINGLING: 0
NAUSEA: 0
SPUTUM PRODUCTION: 0
NECK PAIN: 0
BLURRED VISION: 0
WEIGHT LOSS: 0
WHEEZING: 0
INSOMNIA: 0
ABDOMINAL PAIN: 0
NERVOUS/ANXIOUS: 0
SORE THROAT: 0
COUGH: 0
SENSORY CHANGE: 0
PALPITATIONS: 0
DIZZINESS: 0
FEVER: 0
HEARTBURN: 0
MEMORY LOSS: 0
MYALGIAS: 0
BLOOD IN STOOL: 0
ORTHOPNEA: 0
DIAPHORESIS: 0
WEAKNESS: 0
FOCAL WEAKNESS: 0
BRUISES/BLEEDS EASILY: 0
HALLUCINATIONS: 0
VOMITING: 0
SHORTNESS OF BREATH: 0
BACK PAIN: 0
DOUBLE VISION: 0
TREMORS: 0
CHILLS: 0
SPEECH CHANGE: 0
DEPRESSION: 0

## 2023-11-06 ASSESSMENT — PATIENT HEALTH QUESTIONNAIRE - PHQ9: CLINICAL INTERPRETATION OF PHQ2 SCORE: 0

## 2023-11-06 ASSESSMENT — FIBROSIS 4 INDEX: FIB4 SCORE: 1.07

## 2023-11-06 ASSESSMENT — LIFESTYLE VARIABLES: SUBSTANCE_ABUSE: 0

## 2023-11-06 NOTE — PROGRESS NOTES
Subjective     Monica Manzo is a 58 y.o. female who presents with Annual Wellness Visit        She is here for her annual visit.    HPI     has a past medical history of Anesthesia, Ankle fracture (12/08), Duodenal ulcer, unspecified as acute or chronic, without hemorrhage, perforation, or obstruction (7/2009), Endometriosis, Gastric polyps, Gastritis (2007), GERD (gastroesophageal reflux disease) (6/5/2009), Gynecological disorder, Hepatitis C (1998), Hiatus hernia syndrome, History of anemia, History of hepatitis C (1996), History of hypothyroidism, Intestinal metaplasia of gastric mucosa, Intestinal metaplasia of gastric mucosa, and PONV (postoperative nausea and vomiting).   has a past surgical history that includes hysterectomy, vaginal; liver biopsy (4/17/08); tonsillectomy; pippa by laparoscopy (4/17/08); pr anesth,surg breast reconstructive (12/15/2010); nissen fundoplication laparoscopic (7/6/2011); pr breast reduction; hiatal hernia repair; abdominal hysterectomy total; pelviscopy (4/22/2013); pelviscopy (1/29/2014); lysis adhesions gyn (1/29/2014); orif, ankle (12/14/2008); orif, ankle (12/08); hardware removal ortho (Right, 9/11/2018); pr lap,diagnostic abdomen (N/A, 9/11/2019); and salpingo oophorectomy (Right, 9/11/2019).  family history includes Breast Cancer in her mother; Cancer (age of onset: 45) in her mother; Diabetes in her sister; Heart Disease in her father.   reports that she has never smoked. She has never used smokeless tobacco. She reports that she does not drink alcohol and does not use drugs.      Current Outpatient Medications:     FIBER PO, Take 2 Doses by mouth every day., Disp: , Rfl:     Multiple Vitamin (MULTI-VITAMIN DAILY PO), Take 1 Dose by mouth every day., Disp: , Rfl:     VITAMIN E PO, Take 1 Tab by mouth every day., Disp: , Rfl:     Ascorbic Acid (VITAMIN C PO), Take 1 Tab by mouth every day., Disp: , Rfl:   is allergic to morphine.    Review of Systems  "  Constitutional:  Negative for chills, diaphoresis, fever, malaise/fatigue and weight loss.   HENT:  Positive for tinnitus. Negative for congestion, hearing loss and sore throat.         The ringing in her ears is more than normal.   Eyes:  Negative for blurred vision and double vision.   Respiratory:  Negative for cough, sputum production, shortness of breath and wheezing.    Cardiovascular:  Negative for chest pain, palpitations, orthopnea and leg swelling.   Gastrointestinal:  Negative for abdominal pain, blood in stool, diarrhea, heartburn, nausea and vomiting.   Genitourinary:  Negative for dysuria, frequency, hematuria and urgency.   Musculoskeletal:  Negative for back pain, joint pain, myalgias and neck pain.   Skin:  Negative for rash.   Neurological:  Negative for dizziness, tingling, tremors, sensory change, speech change, focal weakness, weakness and headaches.   Endo/Heme/Allergies:  Positive for environmental allergies. Does not bruise/bleed easily.   Psychiatric/Behavioral:  Negative for depression, hallucinations, memory loss, substance abuse and suicidal ideas. The patient is not nervous/anxious and does not have insomnia.               Objective     /60   Pulse 90   Temp 36.7 °C (98 °F)   Resp 16   Ht 1.715 m (5' 7.5\")   Wt 64.1 kg (141 lb 6.4 oz)   SpO2 96%   BMI 21.82 kg/m²      Physical Exam  Vitals reviewed.   Constitutional:       Appearance: She is well-developed.   HENT:      Head: Normocephalic and atraumatic.   Eyes:      General:         Left eye: No discharge.      Pupils: Pupils are equal, round, and reactive to light.   Neck:      Thyroid: No thyromegaly.      Vascular: No JVD.   Cardiovascular:      Rate and Rhythm: Normal rate and regular rhythm.      Heart sounds: Normal heart sounds. No murmur heard.  Pulmonary:      Effort: Pulmonary effort is normal. No respiratory distress.      Breath sounds: Normal breath sounds. No wheezing or rales.   Abdominal:      General: " Bowel sounds are normal. There is no distension.      Palpations: Abdomen is soft. There is no mass.      Tenderness: There is no abdominal tenderness.   Musculoskeletal:         General: Normal range of motion.      Cervical back: Normal range of motion and neck supple.   Lymphadenopathy:      Cervical: No cervical adenopathy.   Skin:     General: Skin is warm and dry.      Findings: No erythema or rash.   Neurological:      Mental Status: She is alert and oriented to person, place, and time.      Coordination: Coordination normal.   Psychiatric:         Mood and Affect: Mood normal.         Behavior: Behavior normal.         Lab results from 10/29/2023 reviewed and discussed  eGFR discussed, improved.  Lipids now excellent, CMP good overall.  CBC normal.        Anticipatory guidance:  seatbelt worn.  Yearly preventive examination recommended.  Mammogram recommended yearly.  Has a pap scheduled with , still has right ovary.  [facility].  Will be getting their COVID booster in a few weeks when they have it in again.  Recommend second Hep A vaccine.  Due for TDaP 5/2026.  Colonoscopy due 2028.      Assessment & Plan        1. Routine general medical examination at a health care facility  She is generally well.    2. Laboratory examination ordered as part of a routine general medical examination  Routine orders discussed and placed, due for March  - Comp Metabolic Panel; Future  - CBC WITHOUT DIFFERENTIAL; Future  - TSH; Future  - Lipid Profile; Future    3. Need for immunization against influenza  Administered today right deltoid.    - INFLUENZA VACCINE QUAD INJ (PF)       Recheck one year, sooner as needed

## 2024-01-10 ENCOUNTER — HOSPITAL ENCOUNTER (OUTPATIENT)
Dept: LAB | Facility: MEDICAL CENTER | Age: 59
End: 2024-01-10
Attending: OBSTETRICS & GYNECOLOGY
Payer: COMMERCIAL

## 2024-01-10 PROCEDURE — 88175 CYTOPATH C/V AUTO FLUID REDO: CPT

## 2024-01-10 PROCEDURE — 87624 HPV HI-RISK TYP POOLED RSLT: CPT

## 2024-01-13 LAB
CYTOLOGIST CVX/VAG CYTO: NORMAL
CYTOLOGY CVX/VAG DOC CYTO: NORMAL
CYTOLOGY CVX/VAG DOC THIN PREP: NORMAL
HPV I/H RISK 4 DNA CVX QL PROBE+SIG AMP: NEGATIVE
NOTE NL11727A: NORMAL
OTHER STN SPEC: NORMAL
STAT OF ADQ CVX/VAG CYTO-IMP: NORMAL

## 2024-01-31 ENCOUNTER — APPOINTMENT (OUTPATIENT)
Dept: RADIOLOGY | Facility: MEDICAL CENTER | Age: 59
End: 2024-01-31
Attending: OBSTETRICS & GYNECOLOGY
Payer: COMMERCIAL

## 2024-02-13 DIAGNOSIS — R79.89 ELEVATED SERUM CREATININE: ICD-10-CM

## 2024-02-13 DIAGNOSIS — N18.31 STAGE 3A CHRONIC KIDNEY DISEASE: ICD-10-CM

## 2024-02-13 LAB
ALBUMIN SERPL-MCNC: 4.3 G/DL (ref 3.8–4.9)
ALBUMIN/GLOB SERPL: 1.8 {RATIO} (ref 1.2–2.2)
ALP SERPL-CCNC: 75 IU/L (ref 44–121)
ALT SERPL-CCNC: 16 IU/L (ref 0–32)
AST SERPL-CCNC: 22 IU/L (ref 0–40)
BILIRUB SERPL-MCNC: 0.4 MG/DL (ref 0–1.2)
BUN SERPL-MCNC: 14 MG/DL (ref 6–24)
BUN/CREAT SERPL: 13 (ref 9–23)
CALCIUM SERPL-MCNC: 8.7 MG/DL (ref 8.7–10.2)
CHLORIDE SERPL-SCNC: 102 MMOL/L (ref 96–106)
CHOLEST SERPL-MCNC: 182 MG/DL (ref 100–199)
CO2 SERPL-SCNC: 25 MMOL/L (ref 20–29)
CREAT SERPL-MCNC: 1.1 MG/DL (ref 0.57–1)
EGFRCR SERPLBLD CKD-EPI 2021: 59 ML/MIN/1.73
ERYTHROCYTE [DISTWIDTH] IN BLOOD BY AUTOMATED COUNT: 12.3 % (ref 11.7–15.4)
GLOBULIN SER CALC-MCNC: 2.4 G/DL (ref 1.5–4.5)
GLUCOSE SERPL-MCNC: 104 MG/DL (ref 70–99)
HCT VFR BLD AUTO: 43.5 % (ref 34–46.6)
HDLC SERPL-MCNC: 78 MG/DL
HGB BLD-MCNC: 14.5 G/DL (ref 11.1–15.9)
LABORATORY COMMENT REPORT: NORMAL
LDLC SERPL CALC-MCNC: 92 MG/DL (ref 0–99)
MCH RBC QN AUTO: 31.9 PG (ref 26.6–33)
MCHC RBC AUTO-ENTMCNC: 33.3 G/DL (ref 31.5–35.7)
MCV RBC AUTO: 96 FL (ref 79–97)
NRBC BLD AUTO-RTO: NORMAL %
PLATELET # BLD AUTO: 228 X10E3/UL (ref 150–450)
POTASSIUM SERPL-SCNC: 4.3 MMOL/L (ref 3.5–5.2)
PROT SERPL-MCNC: 6.7 G/DL (ref 6–8.5)
RBC # BLD AUTO: 4.55 X10E6/UL (ref 3.77–5.28)
SODIUM SERPL-SCNC: 140 MMOL/L (ref 134–144)
TRIGL SERPL-MCNC: 65 MG/DL (ref 0–149)
TSH SERPL DL<=0.005 MIU/L-ACNC: 1.68 UIU/ML (ref 0.45–4.5)
VLDLC SERPL CALC-MCNC: 12 MG/DL (ref 5–40)
WBC # BLD AUTO: 4.7 X10E3/UL (ref 3.4–10.8)

## 2024-02-13 NOTE — PROGRESS NOTES
Patient has a history of mildly elevated creatinine.  However, this has been gradually increasing starting around 2009.  Referral to nephrology is discussed and placed.

## 2024-03-07 ENCOUNTER — OFFICE VISIT (OUTPATIENT)
Dept: NEPHROLOGY | Facility: MEDICAL CENTER | Age: 59
End: 2024-03-07
Payer: COMMERCIAL

## 2024-03-07 VITALS
BODY MASS INDEX: 20.61 KG/M2 | TEMPERATURE: 97.4 F | HEART RATE: 77 BPM | WEIGHT: 136 LBS | OXYGEN SATURATION: 97 % | HEIGHT: 68 IN | RESPIRATION RATE: 18 BRPM | DIASTOLIC BLOOD PRESSURE: 74 MMHG | SYSTOLIC BLOOD PRESSURE: 120 MMHG

## 2024-03-07 DIAGNOSIS — N18.31 STAGE 3A CHRONIC KIDNEY DISEASE: ICD-10-CM

## 2024-03-07 DIAGNOSIS — D64.9 ANEMIA, UNSPECIFIED TYPE: ICD-10-CM

## 2024-03-07 DIAGNOSIS — I10 HYPERTENSION, UNSPECIFIED TYPE: ICD-10-CM

## 2024-03-07 DIAGNOSIS — E55.9 VITAMIN D DEFICIENCY: ICD-10-CM

## 2024-03-07 PROCEDURE — 99204 OFFICE O/P NEW MOD 45 MIN: CPT | Performed by: INTERNAL MEDICINE

## 2024-03-07 PROCEDURE — 3074F SYST BP LT 130 MM HG: CPT | Performed by: INTERNAL MEDICINE

## 2024-03-07 PROCEDURE — 3078F DIAST BP <80 MM HG: CPT | Performed by: INTERNAL MEDICINE

## 2024-03-07 ASSESSMENT — ENCOUNTER SYMPTOMS
FLANK PAIN: 0
EYES NEGATIVE: 1
PALPITATIONS: 0
FEVER: 0
CHILLS: 0
HEMOPTYSIS: 0
SHORTNESS OF BREATH: 0
NAUSEA: 0
ABDOMINAL PAIN: 0
DIARRHEA: 0
SINUS PAIN: 0
WHEEZING: 0
COUGH: 0
ORTHOPNEA: 0
WEIGHT LOSS: 0
VOMITING: 0

## 2024-03-07 ASSESSMENT — FIBROSIS 4 INDEX: FIB4 SCORE: 1.35

## 2024-03-07 NOTE — PROGRESS NOTES
"Subjective     Monica Manzo is a 58 y.o. female who presents with New Patient and Chronic Kidney Disease (Stage 3a, Elevated serum creatinine)            HPI  Monica is coming today for initial evaluation of CKD IIIa  Doing well, no complaints, no dysuria/hematuria/flank pain  No NSAID's  HTN: BP well controlled off BP medications  Following low salt diet  CKD ; creat level stable at baseline 1.0-1.1    Review of Systems   Constitutional:  Negative for chills, fever, malaise/fatigue and weight loss.   HENT:  Negative for congestion, hearing loss and sinus pain.    Eyes: Negative.    Respiratory:  Negative for cough, hemoptysis, shortness of breath and wheezing.    Cardiovascular:  Negative for chest pain, palpitations, orthopnea and leg swelling.   Gastrointestinal:  Negative for abdominal pain, diarrhea, nausea and vomiting.   Genitourinary:  Negative for dysuria, flank pain, frequency, hematuria and urgency.   Skin: Negative.    All other systems reviewed and are negative.         Past Medical History:   Diagnosis Date    Anesthesia     PONV  with morphine , low b/p    Ankle fracture     orif right ankle     Duodenal ulcer, unspecified as acute or chronic, without hemorrhage, perforation, or obstruction 2009    Endometriosis     Gastric polyps     Gastritis     resolved post surgery    GERD (gastroesophageal reflux disease) 2009    Gynecological disorder     Hepatitis C     \"but now gone\"    Hiatus hernia syndrome     repaired    History of anemia     History of hepatitis C     hep C, successfully treated    History of hypothyroidism     Intestinal metaplasia of gastric mucosa     Intestinal metaplasia of gastric mucosa     PONV (postoperative nausea and vomiting)        Family History   Problem Relation Age of Onset    Cancer Mother 45        breast    Breast Cancer Mother     Diabetes Sister         type 2    Heart Disease Father          of MI at 52       Social History "     Socioeconomic History    Marital status:     Highest education level: Bachelor's degree (e.g., BA, AB, BS)   Occupational History    Occupation: human resources     Employer: [employer]   Tobacco Use    Smoking status: Never    Smokeless tobacco: Never   Vaping Use    Vaping Use: Never used   Substance and Sexual Activity    Alcohol use: No     Alcohol/week: 0.0 oz    Drug use: No    Sexual activity: Not Currently     Social Determinants of Health     Financial Resource Strain: Low Risk  (11/29/2022)    Overall Financial Resource Strain (CARDIA)     Difficulty of Paying Living Expenses: Not hard at all   Food Insecurity: No Food Insecurity (11/29/2022)    Hunger Vital Sign     Worried About Running Out of Food in the Last Year: Never true     Ran Out of Food in the Last Year: Never true   Transportation Needs: No Transportation Needs (11/29/2022)    PRAPARE - Transportation     Lack of Transportation (Medical): No     Lack of Transportation (Non-Medical): No   Physical Activity: Insufficiently Active (11/29/2022)    Exercise Vital Sign     Days of Exercise per Week: 3 days     Minutes of Exercise per Session: 40 min   Stress: No Stress Concern Present (11/29/2022)    [place] [employer] - Occupational Stress Questionnaire     Feeling of Stress : Not at all   Social Connections: Unknown (11/29/2022)    Social Connection and Isolation Panel [NHANES]     Frequency of Communication with Friends and Family: Once a week     Frequency of Social Gatherings with Friends and Family: Twice a week     Attends Quaker Services: Patient declined     Active Member of Clubs or Organizations: No     Attends Club or Organization Meetings: Patient declined     Marital Status:    Housing Stability: Low Risk  (11/29/2022)    Housing Stability Vital Sign     Unable to Pay for Housing in the Last Year: No     Number of Places Lived in the Last Year: 1     Unstable Housing in  "the Last Year: No         Objective     /74 (BP Location: Right arm, Patient Position: Sitting, BP Cuff Size: Adult)   Pulse 77   Temp 36.3 °C (97.4 °F) (Temporal)   Resp 18   Ht 1.715 m (5' 7.5\")   Wt 61.7 kg (136 lb)   SpO2 97%   BMI 20.99 kg/m²      Physical Exam  Vitals reviewed.   Constitutional:       General: She is not in acute distress.     Appearance: Normal appearance. She is well-developed. She is not diaphoretic.   HENT:      Head: Normocephalic and atraumatic.      Nose: Nose normal.      Mouth/Throat:      Mouth: Mucous membranes are moist.      Pharynx: Oropharynx is clear.   Eyes:      Extraocular Movements: Extraocular movements intact.      Conjunctiva/sclera: Conjunctivae normal.      Pupils: Pupils are equal, round, and reactive to light.   Cardiovascular:      Rate and Rhythm: Normal rate and regular rhythm.      Pulses: Normal pulses.      Heart sounds: Normal heart sounds.   Pulmonary:      Effort: Pulmonary effort is normal. No respiratory distress.      Breath sounds: Normal breath sounds. No wheezing or rales.   Abdominal:      General: Bowel sounds are normal. There is no distension.      Palpations: Abdomen is soft. There is no mass.      Tenderness: There is no abdominal tenderness. There is no right CVA tenderness or left CVA tenderness.   Musculoskeletal:      Cervical back: Normal range of motion and neck supple.      Right lower leg: No edema.      Left lower leg: No edema.   Skin:     General: Skin is warm.      Coloration: Skin is not pale.      Findings: No erythema or rash.   Neurological:      General: No focal deficit present.      Mental Status: She is alert and oriented to person, place, and time.      Cranial Nerves: No cranial nerve deficit.      Coordination: Coordination normal.   Psychiatric:         Mood and Affect: Mood normal.         Behavior: Behavior normal.         Thought Content: Thought content normal.         Judgment: Judgment normal. "     Laboratory results reviewed: d/w Pt  .     Latest Reference Range & Units 12/30/22 04:33 10/30/23 04:55 02/12/24 04:40   WBC 3.4 - 10.8 x10E3/uL 4.4 4.9 4.7   RBC 3.77 - 5.28 x10E6/uL 4.97 4.70 4.55   Hemoglobin 11.1 - 15.9 g/dL 15.4 14.9 14.5   Hematocrit 34.0 - 46.6 % 47.0 (H) 45.4 43.5   MCV 79 - 97 fL 95 97 96   MCH 26.6 - 33.0 pg 31.0 31.7 31.9   MCHC 31.5 - 35.7 g/dL 32.8 32.8 33.3   RDW 11.7 - 15.4 % 12.7 12.3 12.3   Platelet Count 150 - 450 x10E3/uL 212 216 228   Immature Cells   CANCELED    Neutrophils-Polys Not Estab. %  55    Neutrophils (Absolute) 1.4 - 7.0 x10E3/uL  2.7    Lymphocytes Not Estab. %  35    Lymphs (Absolute) 0.7 - 3.1 x10E3/uL  1.7    Monocytes Not Estab. %  8    Monos (Absolute) 0.1 - 0.9 x10E3/uL  0.4    Eosinophils Not Estab. %  1    Eos (Absolute) 0.0 - 0.4 x10E3/uL  0.1    Basophils Not Estab. %  1    Baso (Absolute) 0.0 - 0.2 x10E3/uL  0.0    Immature Granulocytes Not Estab. %  0    Immature Granulocytes (abs) 0.0 - 0.1 x10E3/uL  0.0    Nucleated RBC  CANCELED CANCELED CANCELED   Comments-Diff   CANCELED    Sodium 134 - 144 mmol/L 142 140 140   Potassium 3.5 - 5.2 mmol/L 4.6 5.1 4.3   Chloride 96 - 106 mmol/L 103 103 102   Co2 20 - 29 mmol/L 25 25 25   Glucose 70 - 99 mg/dL 92 83 104 (H)   Bun 6 - 24 mg/dL 18 16 14   Creatinine 0.57 - 1.00 mg/dL 1.11 (H) 1.03 (H) 1.10 (H)   Bun-Creatinine Ratio 9 - 23  16 16 13   Calcium 8.7 - 10.2 mg/dL 9.7 9.6 8.7   AST(SGOT) 0 - 40 IU/L 19 18 22   ALT(SGPT) 0 - 32 IU/L 14 19 16   Alkaline Phosphatase 44 - 121 IU/L 86 70 75   Total Bilirubin 0.0 - 1.2 mg/dL 0.4 0.4 0.4   Albumin 3.8 - 4.9 g/dL 4.6 4.4 4.3   Total Protein 6.0 - 8.5 g/dL 7.2 7.0 6.7   Globulin 1.5 - 4.5 g/dL 2.6 2.6 2.4   A-G Ratio 1.2 - 2.2  1.8 1.7 1.8   Cholesterol,Tot 100 - 199 mg/dL 216 (H) 181 182   Triglycerides 0 - 149 mg/dL 80 75 65   HDL >39 mg/dL 74 69 78   LDL Chol Calc (NIH) 0 - 99 mg/dL 128 (H) 98 92   VLDL Cholesterol Calc 5 - 40 mg/dL 14 14 12   (H): Data is  abnormally high         Assessment & Plan        1. Stage 3a chronic kidney disease (HCC)      [place] level stable at baseline -to monitor  - URINALYSIS; Future  - Basic Metabolic Panel; Future    2. Hypertension, unspecified type      BP well controlled -no need for treatment  - Basic Metabolic Panel; Future    3. Anemia, unspecified type      Hb stable WNL  - CBC WITHOUT DIFFERENTIAL; Future    4. Vitamin D deficiency      Continue supplementations  - VITAMIN D 25-HYDROXY     Recs:  Keep well hydrated  Low salt diet  Monitor BP  Avoid NSAID's  F/u in 3-4 months  Thank you for the consult

## 2024-04-02 ENCOUNTER — HOSPITAL ENCOUNTER (OUTPATIENT)
Dept: RADIOLOGY | Facility: MEDICAL CENTER | Age: 59
End: 2024-04-02
Attending: FAMILY MEDICINE
Payer: COMMERCIAL

## 2024-04-02 DIAGNOSIS — Z12.31 VISIT FOR SCREENING MAMMOGRAM: ICD-10-CM

## 2024-04-02 PROCEDURE — 77067 SCR MAMMO BI INCL CAD: CPT

## 2024-06-23 LAB
25(OH)D3+25(OH)D2 SERPL-MCNC: 49.7 NG/ML (ref 30–100)
APPEARANCE UR: CLEAR
BACTERIA #/AREA URNS HPF: ABNORMAL /[HPF]
BACTERIA UR CULT: NO GROWTH
BACTERIA UR CULT: NORMAL
BASOPHILS # BLD AUTO: 0 X10E3/UL (ref 0–0.2)
BASOPHILS NFR BLD AUTO: 1 %
BILIRUB UR QL STRIP: NEGATIVE
BUN SERPL-MCNC: 13 MG/DL (ref 6–24)
BUN/CREAT SERPL: 14 (ref 9–23)
CASTS URNS MICRO: ABNORMAL
CASTS URNS QL MICRO: ABNORMAL /LPF
CHLORIDE SERPL-SCNC: 107 MMOL/L (ref 96–106)
CO2 SERPL-SCNC: 24 MMOL/L (ref 20–29)
COLOR UR: YELLOW
CREAT SERPL-MCNC: 0.91 MG/DL (ref 0.57–1)
CRYSTALS URNS MICRO: ABNORMAL
EGFRCR SERPLBLD CKD-EPI 2021: 74 ML/MIN/1.73
EOSINOPHIL # BLD AUTO: 0.1 X10E3/UL (ref 0–0.4)
EOSINOPHIL NFR BLD AUTO: 2 %
EPI CELLS #/AREA URNS HPF: ABNORMAL /HPF (ref 0–10)
ERYTHROCYTE [DISTWIDTH] IN BLOOD BY AUTOMATED COUNT: 12.6 % (ref 11.7–15.4)
GLUCOSE SERPL-MCNC: 86 MG/DL (ref 70–99)
GLUCOSE UR QL STRIP: NEGATIVE
HCT VFR BLD AUTO: 42.2 % (ref 34–46.6)
HGB BLD-MCNC: 13.9 G/DL (ref 11.1–15.9)
HGB UR QL STRIP: NEGATIVE
IMM GRANULOCYTES # BLD AUTO: 0 X10E3/UL (ref 0–0.1)
IMM GRANULOCYTES NFR BLD AUTO: 0 %
IMMATURE CELLS  115398: NORMAL
KETONES UR QL STRIP: ABNORMAL
LEUKOCYTE ESTERASE UR QL STRIP: ABNORMAL
LYMPHOCYTES # BLD AUTO: 1.6 X10E3/UL (ref 0.7–3.1)
LYMPHOCYTES NFR BLD AUTO: 43 %
MCH RBC QN AUTO: 31.1 PG (ref 26.6–33)
MCHC RBC AUTO-ENTMCNC: 32.9 G/DL (ref 31.5–35.7)
MCV RBC AUTO: 94 FL (ref 79–97)
MICRO URNS: ABNORMAL
MICRO URNS: ABNORMAL
MONOCYTES # BLD AUTO: 0.3 X10E3/UL (ref 0.1–0.9)
MONOCYTES NFR BLD AUTO: 8 %
MORPHOLOGY BLD-IMP: NORMAL
MUCOUS THREADS URNS QL MICRO: ABNORMAL
NEUTROPHILS # BLD AUTO: 1.8 X10E3/UL (ref 1.4–7)
NEUTROPHILS NFR BLD AUTO: 46 %
NITRITE UR QL STRIP: NEGATIVE
NRBC BLD AUTO-RTO: NORMAL %
PH UR STRIP: 6 [PH] (ref 5–7.5)
PLATELET # BLD AUTO: 204 X10E3/UL (ref 150–450)
POTASSIUM SERPL-SCNC: 4.3 MMOL/L (ref 3.5–5.2)
PROT UR QL STRIP: ABNORMAL
RBC # BLD AUTO: 4.47 X10E6/UL (ref 3.77–5.28)
RBC #/AREA URNS HPF: ABNORMAL /HPF (ref 0–2)
RENAL EPI CELLS #/AREA URNS HPF: ABNORMAL /[HPF]
SODIUM SERPL-SCNC: 142 MMOL/L (ref 134–144)
SP GR UR STRIP: 1.03 (ref 1–1.03)
T VAGINALIS URNS QL MICRO: ABNORMAL
UNIDENT CRYS URNS QL MICRO: ABNORMAL
URINALYSIS REFLEX  377202: ABNORMAL
URNS CMNT MICRO: ABNORMAL
UROBILINOGEN UR STRIP-MCNC: 0.2 MG/DL (ref 0.2–1)
WBC # BLD AUTO: 3.8 X10E3/UL (ref 3.4–10.8)
WBC #/AREA URNS HPF: ABNORMAL /HPF (ref 0–5)
YEAST #/AREA URNS HPF: ABNORMAL /[HPF]

## 2024-07-03 ENCOUNTER — APPOINTMENT (OUTPATIENT)
Dept: NEPHROLOGY | Facility: MEDICAL CENTER | Age: 59
End: 2024-07-03
Payer: COMMERCIAL

## 2024-11-19 DIAGNOSIS — E78.5 DYSLIPIDEMIA: ICD-10-CM

## 2024-11-19 DIAGNOSIS — Z80.3 FAMILY HISTORY OF BREAST CANCER IN MOTHER: ICD-10-CM

## 2024-11-19 DIAGNOSIS — R73.09 ELEVATED GLUCOSE LEVEL: ICD-10-CM

## 2024-11-19 DIAGNOSIS — Z86.2 HISTORY OF IRON DEFICIENCY ANEMIA: ICD-10-CM

## 2024-11-19 DIAGNOSIS — Z87.19 HISTORY OF GASTRITIS: ICD-10-CM

## 2024-11-19 DIAGNOSIS — N18.31 STAGE 3A CHRONIC KIDNEY DISEASE: ICD-10-CM

## 2024-11-19 NOTE — PROGRESS NOTES
Lab orders placed.  BRCA orders also included as mother has breast cancer again and her oncologist has requested that her children be tested.

## 2024-12-03 SDOH — HEALTH STABILITY: PHYSICAL HEALTH: ON AVERAGE, HOW MANY MINUTES DO YOU ENGAGE IN EXERCISE AT THIS LEVEL?: 50 MIN

## 2024-12-03 SDOH — ECONOMIC STABILITY: INCOME INSECURITY: IN THE LAST 12 MONTHS, WAS THERE A TIME WHEN YOU WERE NOT ABLE TO PAY THE MORTGAGE OR RENT ON TIME?: NO

## 2024-12-03 SDOH — ECONOMIC STABILITY: INCOME INSECURITY: HOW HARD IS IT FOR YOU TO PAY FOR THE VERY BASICS LIKE FOOD, HOUSING, MEDICAL CARE, AND HEATING?: NOT HARD AT ALL

## 2024-12-03 SDOH — ECONOMIC STABILITY: FOOD INSECURITY: WITHIN THE PAST 12 MONTHS, THE FOOD YOU BOUGHT JUST DIDN'T LAST AND YOU DIDN'T HAVE MONEY TO GET MORE.: NEVER TRUE

## 2024-12-03 SDOH — ECONOMIC STABILITY: FOOD INSECURITY: WITHIN THE PAST 12 MONTHS, YOU WORRIED THAT YOUR FOOD WOULD RUN OUT BEFORE YOU GOT MONEY TO BUY MORE.: NEVER TRUE

## 2024-12-03 SDOH — HEALTH STABILITY: PHYSICAL HEALTH: ON AVERAGE, HOW MANY DAYS PER WEEK DO YOU ENGAGE IN MODERATE TO STRENUOUS EXERCISE (LIKE A BRISK WALK)?: 4 DAYS

## 2024-12-03 ASSESSMENT — SOCIAL DETERMINANTS OF HEALTH (SDOH)
IN THE PAST 12 MONTHS, HAS THE ELECTRIC, GAS, OIL, OR WATER COMPANY THREATENED TO SHUT OFF SERVICE IN YOUR HOME?: NO
HOW OFTEN DO YOU HAVE SIX OR MORE DRINKS ON ONE OCCASION: NEVER
DO YOU BELONG TO ANY CLUBS OR ORGANIZATIONS SUCH AS CHURCH GROUPS UNIONS, FRATERNAL OR ATHLETIC GROUPS, OR SCHOOL GROUPS?: YES
HOW OFTEN DO YOU ATTENT MEETINGS OF THE CLUB OR ORGANIZATION YOU BELONG TO?: 1 TO 4 TIMES PER YEAR
HOW OFTEN DO YOU ATTEND CHURCH OR RELIGIOUS SERVICES?: PATIENT DECLINED
DO YOU BELONG TO ANY CLUBS OR ORGANIZATIONS SUCH AS CHURCH GROUPS UNIONS, FRATERNAL OR ATHLETIC GROUPS, OR SCHOOL GROUPS?: YES
HOW OFTEN DO YOU ATTEND CHURCH OR RELIGIOUS SERVICES?: PATIENT DECLINED
HOW OFTEN DO YOU GET TOGETHER WITH FRIENDS OR RELATIVES?: ONCE A WEEK
HOW MANY DRINKS CONTAINING ALCOHOL DO YOU HAVE ON A TYPICAL DAY WHEN YOU ARE DRINKING: PATIENT DOES NOT DRINK
IN A TYPICAL WEEK, HOW MANY TIMES DO YOU TALK ON THE PHONE WITH FAMILY, FRIENDS, OR NEIGHBORS?: THREE TIMES A WEEK
HOW OFTEN DO YOU HAVE A DRINK CONTAINING ALCOHOL: NEVER
HOW OFTEN DO YOU GET TOGETHER WITH FRIENDS OR RELATIVES?: ONCE A WEEK
HOW HARD IS IT FOR YOU TO PAY FOR THE VERY BASICS LIKE FOOD, HOUSING, MEDICAL CARE, AND HEATING?: NOT HARD AT ALL
HOW OFTEN DO YOU ATTENT MEETINGS OF THE CLUB OR ORGANIZATION YOU BELONG TO?: 1 TO 4 TIMES PER YEAR
WITHIN THE PAST 12 MONTHS, YOU WORRIED THAT YOUR FOOD WOULD RUN OUT BEFORE YOU GOT THE MONEY TO BUY MORE: NEVER TRUE
IN A TYPICAL WEEK, HOW MANY TIMES DO YOU TALK ON THE PHONE WITH FAMILY, FRIENDS, OR NEIGHBORS?: THREE TIMES A WEEK

## 2024-12-05 ENCOUNTER — APPOINTMENT (OUTPATIENT)
Dept: MEDICAL GROUP | Facility: MEDICAL CENTER | Age: 59
End: 2024-12-05
Payer: COMMERCIAL

## 2024-12-05 VITALS
OXYGEN SATURATION: 99 % | HEART RATE: 68 BPM | WEIGHT: 145.94 LBS | SYSTOLIC BLOOD PRESSURE: 108 MMHG | HEIGHT: 68 IN | TEMPERATURE: 98 F | RESPIRATION RATE: 12 BRPM | BODY MASS INDEX: 22.12 KG/M2 | DIASTOLIC BLOOD PRESSURE: 64 MMHG

## 2024-12-05 DIAGNOSIS — Z23 NEED FOR VACCINATION: ICD-10-CM

## 2024-12-05 DIAGNOSIS — Z00.00 ROUTINE GENERAL MEDICAL EXAMINATION AT A HEALTH CARE FACILITY: ICD-10-CM

## 2024-12-05 PROBLEM — R05.9 COUGH: Status: RESOLVED | Noted: 2022-09-02 | Resolved: 2024-12-05

## 2024-12-05 PROBLEM — N18.31 STAGE 3A CHRONIC KIDNEY DISEASE: Status: RESOLVED | Noted: 2024-02-13 | Resolved: 2024-12-05

## 2024-12-05 PROCEDURE — 90656 IIV3 VACC NO PRSV 0.5 ML IM: CPT | Performed by: FAMILY MEDICINE

## 2024-12-05 PROCEDURE — 99396 PREV VISIT EST AGE 40-64: CPT | Mod: 25 | Performed by: FAMILY MEDICINE

## 2024-12-05 PROCEDURE — 3078F DIAST BP <80 MM HG: CPT | Performed by: FAMILY MEDICINE

## 2024-12-05 PROCEDURE — 90471 IMMUNIZATION ADMIN: CPT | Performed by: FAMILY MEDICINE

## 2024-12-05 PROCEDURE — 3074F SYST BP LT 130 MM HG: CPT | Performed by: FAMILY MEDICINE

## 2024-12-05 ASSESSMENT — ENCOUNTER SYMPTOMS
VOMITING: 0
SORE THROAT: 0
DOUBLE VISION: 0
HEADACHES: 0
DIAPHORESIS: 0
SENSORY CHANGE: 0
SPEECH CHANGE: 0
DIZZINESS: 0
CONSTIPATION: 0
WEAKNESS: 0
NAUSEA: 0
BLURRED VISION: 0
FOCAL WEAKNESS: 0
COUGH: 0
INSOMNIA: 0
CHILLS: 0
WEIGHT LOSS: 0
HALLUCINATIONS: 0
SHORTNESS OF BREATH: 0
TINGLING: 0
MYALGIAS: 0
DEPRESSION: 0
MEMORY LOSS: 0
BLOOD IN STOOL: 0
WHEEZING: 0
BRUISES/BLEEDS EASILY: 0
BACK PAIN: 0
SPUTUM PRODUCTION: 0
DIARRHEA: 0
NECK PAIN: 0
TREMORS: 0
FEVER: 0
NERVOUS/ANXIOUS: 0
ORTHOPNEA: 0
ABDOMINAL PAIN: 0
HEARTBURN: 0
PALPITATIONS: 0

## 2024-12-05 ASSESSMENT — FIBROSIS 4 INDEX: FIB4 SCORE: 1.54

## 2024-12-05 ASSESSMENT — PATIENT HEALTH QUESTIONNAIRE - PHQ9: CLINICAL INTERPRETATION OF PHQ2 SCORE: 0

## 2024-12-05 ASSESSMENT — LIFESTYLE VARIABLES: SUBSTANCE_ABUSE: 0

## 2024-12-05 NOTE — PROGRESS NOTES
"Subjective     Monica Manzo is a 59 y.o. female who presents with Annual Wellness Visit        Here for her annual examination.    HPI    Review of Systems   Constitutional:  Negative for chills, diaphoresis, fever, malaise/fatigue and weight loss.   HENT:  Negative for congestion, hearing loss, sore throat and tinnitus.    Eyes:  Negative for blurred vision and double vision.   Respiratory:  Negative for cough, sputum production, shortness of breath and wheezing.    Cardiovascular:  Negative for chest pain, palpitations, orthopnea and leg swelling.   Gastrointestinal:  Negative for abdominal pain, blood in stool, constipation, diarrhea, heartburn, nausea and vomiting.        Still no heartburn or vomiting.  Still occasional abdominal pain.    Genitourinary:  Negative for dysuria, frequency, hematuria and urgency.   Musculoskeletal:  Negative for back pain, joint pain, myalgias and neck pain.   Skin:  Negative for rash.   Neurological:  Negative for dizziness, tingling, tremors, sensory change, speech change, focal weakness, weakness and headaches.   Endo/Heme/Allergies:  Positive for environmental allergies. Does not bruise/bleed easily.   Psychiatric/Behavioral:  Negative for depression, hallucinations, memory loss, substance abuse and suicidal ideas. The patient is not nervous/anxious and does not have insomnia.             Objective     /64   Pulse 68   Temp 36.7 °C (98 °F)   Resp 12   Ht 1.715 m (5' 7.5\")   Wt 66.2 kg (145 lb 15.1 oz)   SpO2 99%   BMI 22.52 kg/m²      Physical Exam  Vitals reviewed.   Constitutional:       Appearance: She is well-developed.   HENT:      Head: Normocephalic and atraumatic.   Eyes:      General:         Left eye: No discharge.      Pupils: Pupils are equal, round, and reactive to light.   Neck:      Thyroid: No thyromegaly.      Vascular: No JVD.   Cardiovascular:      Rate and Rhythm: Normal rate and regular rhythm.      Heart sounds: Normal heart sounds. No " murmur heard.  Pulmonary:      Effort: Pulmonary effort is normal. No respiratory distress.      Breath sounds: Normal breath sounds. No wheezing or rales.   Abdominal:      General: Bowel sounds are normal. There is no distension.      Palpations: Abdomen is soft. There is no mass.      Tenderness: There is no abdominal tenderness.   Musculoskeletal:         General: Normal range of motion.      Cervical back: Normal range of motion and neck supple.   Lymphadenopathy:      Cervical: No cervical adenopathy.   Skin:     General: Skin is warm and dry.      Findings: No erythema or rash.   Neurological:      Mental Status: She is alert and oriented to person, place, and time.      Coordination: Coordination normal.   Psychiatric:         Mood and Affect: Mood normal.         Behavior: Behavior normal.       Awaiting lab results from [facility]         Anticipatory guidance:  colonoscopy due 2028.  Mammogram up to date. Immunizations discussed.  Deferring COVID for now.  Flu vaccine today.  TDaP due 2026.  Shingles vaccine discussed.  Seeing GYN again soon.      Assessment & Plan        Assessment & Plan  Routine general medical examination at a health care facility  She is generally well and medically stable.       Need for vaccination  Administered today right deltoid  Orders:    INFLUENZA VACCINE TRI INJ (PF)         Recheck one year, sooner as needed

## 2025-04-15 ENCOUNTER — HOSPITAL ENCOUNTER (OUTPATIENT)
Dept: RADIOLOGY | Facility: MEDICAL CENTER | Age: 60
End: 2025-04-15
Attending: FAMILY MEDICINE
Payer: COMMERCIAL

## 2025-04-15 DIAGNOSIS — Z12.31 VISIT FOR SCREENING MAMMOGRAM: ICD-10-CM

## 2025-04-15 PROCEDURE — 77067 SCR MAMMO BI INCL CAD: CPT

## 2025-11-24 ENCOUNTER — APPOINTMENT (OUTPATIENT)
Dept: MEDICAL GROUP | Facility: MEDICAL CENTER | Age: 60
End: 2025-11-24
Payer: COMMERCIAL

## (undated) DEVICE — SODIUM CHL IRRIGATION 0.9% 1000ML (12EA/CA)

## (undated) DEVICE — PROTECTOR ULNA NERVE - (36PR/CA)

## (undated) DEVICE — SENSOR SPO2 NEO LNCS ADHESIVE (20/BX) SEE USER NOTES

## (undated) DEVICE — GLOVE SZ 7.5 BIOGEL PI MICRO - PF LF (50PR/BX)

## (undated) DEVICE — GLOVE BIOGEL SZ 6.5 SURGICAL PF LTX (50PR/BX 4BX/CA)

## (undated) DEVICE — HEAD HOLDER JUNIOR/ADULT

## (undated) DEVICE — TUBE E-T HI-LO CUFF 6.5MM (10EA/BX)

## (undated) DEVICE — DRESSING TRANSPARENT FILM TEGADERM 2.375 X 2.75"  (100EA/BX)"

## (undated) DEVICE — MASK ANESTHESIA ADULT  - (100/CA)

## (undated) DEVICE — GOWN SURGEONS LARGE - (32/CA)

## (undated) DEVICE — GLOVE BIOGEL INDICATOR SZ 6.5 SURGICAL PF LTX - (50PR/BX 4BX/CA)

## (undated) DEVICE — CANISTER SUCTION RIGID RED 1500CC (40EA/CA)

## (undated) DEVICE — TRAY FOLEY CATHETER STATLOCK 16FR SURESTEP  (10EA/CA)

## (undated) DEVICE — SLEEVE, VASO, THIGH, MED

## (undated) DEVICE — SUTURE GENERAL

## (undated) DEVICE — CANISTER SUCTION 3000ML MECHANICAL FILTER AUTO SHUTOFF MEDI-VAC NONSTERILE LF DISP  (40EA/CA)

## (undated) DEVICE — GLOVE BIOGEL INDICATOR SZ 7SURGICAL PF LTX - (50/BX 4BX/CA)

## (undated) DEVICE — LACTATED RINGERS INJ 1000 ML - (14EA/CA 60CA/PF)

## (undated) DEVICE — CHLORAPREP 26 ML APPLICATOR - ORANGE TINT(25/CA)

## (undated) DEVICE — DRAPE C-ARM LARGE 41IN X 74 IN - (10/BX 2BX/CA)

## (undated) DEVICE — BLADE SURGICAL CLIPPER - (50EA/CA)

## (undated) DEVICE — GLOVE BIOGEL SZ 8 SURGICAL PF LTX - (50PR/BX 4BX/CA)

## (undated) DEVICE — SUTURE 3-0 VICRYL PLUS SH - 8X 18 INCH (12/BX)

## (undated) DEVICE — ELECTRODE 850 FOAM ADHESIVE - HYDROGEL RADIOTRNSPRNT (50/PK)

## (undated) DEVICE — GOWN WARMING STANDARD FLEX - (30/CA)

## (undated) DEVICE — TUBING CLEARLINK DUO-VENT - C-FLO (48EA/CA)

## (undated) DEVICE — WRAP CO-FLEX 4IN X 5YD STERIL - SELF-ADHERENT (18/CA)

## (undated) DEVICE — TRAY SRGPRP PVP IOD WT PRP - (20/CA)

## (undated) DEVICE — APPLICATOR COTTON TIP 6 IN - STERILE (2EA/PK 100PK/BX)

## (undated) DEVICE — GLOVE BIOGEL INDICATOR SZ 8.5 SURGICAL PF LTX - (50/BX 4BX/CA)

## (undated) DEVICE — KIT ROOM DECONTAMINATION

## (undated) DEVICE — SUCTION INSTRUMENT YANKAUER BULBOUS TIP W/O VENT (50EA/CA)

## (undated) DEVICE — BANDAGE ELASTIC 4 HONEYCOMB - 4"X5YD LF (20/CA)"

## (undated) DEVICE — PACK LOWER EXTREMITY - (2/CA)

## (undated) DEVICE — KIT  I.V. START (100EA/CA)

## (undated) DEVICE — BANDAID X-LARGE 2 X 4 IN LF (50EA/BX)

## (undated) DEVICE — TROCAR LAPSCP 100MM 12MM NTHRD - (6/BX)

## (undated) DEVICE — SET LEADWIRE 5 LEAD BEDSIDE DISPOSABLE ECG (1SET OF 5/EA)

## (undated) DEVICE — MASK AIRWAY SIZE 3 UNIQUE SILICON (10/BX)

## (undated) DEVICE — SUTURE 4-0 VICRYL PLUS FS-2 - 27 INCH (36/BX)

## (undated) DEVICE — SUTURE 3-0 ETHILON FS-1 - (36/BX) 30 INCH

## (undated) DEVICE — TOWELS CLOTH SURGICAL - (4/PK 20PK/CA)

## (undated) DEVICE — PAD SANITARY 11IN MAXI IND WRAPPED  (12EA/PK 24PK/CA)

## (undated) DEVICE — ARMREST CRADLE FOAM - (2PR/PK 12PR/CA)

## (undated) DEVICE — GOWN SURGEONS X-LARGE - DISP. (30/CA)

## (undated) DEVICE — GLOVE BIOGEL SZ 7.5 SURGICAL PF LTX - (50PR/BX 4BX/CA)

## (undated) DEVICE — STERI STRIP COMPOUND BENZOIN - TINCTURE 0.6ML WITH APPLICATOR (40EA/BX)

## (undated) DEVICE — KIT ANESTHESIA W/CIRCUIT & 3/LT BAG W/FILTER (20EA/CA)

## (undated) DEVICE — CATHETER IV 20 GA X 1-1/4 ---SURG.& SDS ONLY--- (50EA/BX)

## (undated) DEVICE — PACK LAPAROSCOPY - (1/CA)

## (undated) DEVICE — TROCAR 5X100 BLADED ADVANCE - FIXATION (6/BX)

## (undated) DEVICE — GLOVE BIOGEL SZ 6 PF LATEX - (50EA/BX 4BX/CA)

## (undated) DEVICE — TUBE CONNECTING SUCTION - CLEAR PLASTIC STERILE 72 IN (50EA/CA)

## (undated) DEVICE — DRAPE LARGE 3 QUARTER - (20/CA)

## (undated) DEVICE — NEPTUNE 4 PORT MANIFOLD - (20/PK)

## (undated) DEVICE — TOURNIQUET CUFF 24 X 4 ONE PORT - STERILE (10/BX)

## (undated) DEVICE — ELECTRODE DUAL RETURN W/ CORD - (50/PK)

## (undated) DEVICE — SOD. CHL. INJ. 0.9% 250 ML - (36/CA 50CA/PF)

## (undated) DEVICE — PADDING CAST 4 IN STERILE - 4 X 4 YDS (24/CA)

## (undated) DEVICE — BLADE SURGICAL #15 - (50/BX 3BX/CA)

## (undated) DEVICE — TUBING SETDISPOS HIGH FLOW II - (10/BX)

## (undated) DEVICE — CLOSURE SKIN STRIP 1/2 X 4 IN - (STERI STRIP) (50/BX 4BX/CA)

## (undated) DEVICE — PAD OR TABLE DA VINCI 2IN X 20IN X 72IN - (12EA/CA)

## (undated) DEVICE — BANDAID SHEER STRIP 3/4 IN (100EA/BX 12BX/CA)

## (undated) DEVICE — SET EXTENSION WITH 2 PORTS (48EA/CA) ***PART #2C8610 IS A SUBSTITUTE*****

## (undated) DEVICE — SUTURE 0 VICRYL PLUS CT-2 - 27 INCH (36/BX)

## (undated) DEVICE — WATER IRRIG. STER. 1500 ML - (9/CA)